# Patient Record
Sex: FEMALE | Race: WHITE | Employment: FULL TIME | URBAN - METROPOLITAN AREA
[De-identification: names, ages, dates, MRNs, and addresses within clinical notes are randomized per-mention and may not be internally consistent; named-entity substitution may affect disease eponyms.]

---

## 2017-03-20 ENCOUNTER — HOSPITAL ENCOUNTER (OUTPATIENT)
Dept: MAMMOGRAPHY | Age: 52
Discharge: HOME | End: 2017-03-20
Attending: OBSTETRICS & GYNECOLOGY | Admitting: OBSTETRICS & GYNECOLOGY

## 2017-03-20 DIAGNOSIS — Z12.31 VISIT FOR SCREENING MAMMOGRAM: ICD-10-CM

## 2018-03-26 ENCOUNTER — HOSPITAL ENCOUNTER (OUTPATIENT)
Dept: MAMMOGRAPHY | Age: 53
Discharge: HOME | End: 2018-03-26
Attending: OBSTETRICS & GYNECOLOGY | Admitting: OBSTETRICS & GYNECOLOGY

## 2018-03-26 DIAGNOSIS — Z12.31 VISIT FOR SCREENING MAMMOGRAM: ICD-10-CM

## 2019-03-27 ENCOUNTER — HOSPITAL ENCOUNTER (OUTPATIENT)
Dept: MAMMOGRAPHY | Age: 54
Discharge: HOME | End: 2019-04-01
Payer: COMMERCIAL

## 2019-03-27 DIAGNOSIS — Z12.31 VISIT FOR SCREENING MAMMOGRAM: ICD-10-CM

## 2019-03-27 PROCEDURE — 77067 SCR MAMMO BI INCL CAD: CPT

## 2019-06-10 ENCOUNTER — NURSE TRIAGE (OUTPATIENT)
Dept: OTHER | Facility: CLINIC | Age: 54
End: 2019-06-10

## 2019-06-10 NOTE — TELEPHONE ENCOUNTER
Reason for Disposition   General information question, no triage required and triager able to answer question    Protocols used: INFORMATION ONLY CALL-ADULT-    Spoke with caller directly regarding needs for new PT that specalizes in VRT. Caller states that she is a new [employer] employee and has not received her insurance card at this time. PT providers found in [facility] web site and provided to caller. [employer] web site also provided to caller and advised to follow up with HR for insurance coverage. Pt agrees with plan.

## 2019-06-25 ENCOUNTER — OFFICE VISIT (OUTPATIENT)
Dept: PRIMARY CARE CLINIC | Age: 54
End: 2019-06-25
Payer: COMMERCIAL

## 2019-06-25 VITALS
WEIGHT: 175 LBS | OXYGEN SATURATION: 98 % | BODY MASS INDEX: 27.47 KG/M2 | DIASTOLIC BLOOD PRESSURE: 64 MMHG | TEMPERATURE: 99 F | HEART RATE: 80 BPM | HEIGHT: 67 IN | SYSTOLIC BLOOD PRESSURE: 138 MMHG | RESPIRATION RATE: 15 BRPM

## 2019-06-25 DIAGNOSIS — J01.00 ACUTE NON-RECURRENT MAXILLARY SINUSITIS: Primary | ICD-10-CM

## 2019-06-25 PROCEDURE — 99203 OFFICE O/P NEW LOW 30 MIN: CPT | Performed by: PHYSICIAN ASSISTANT

## 2019-06-25 RX ORDER — GUAIFENESIN, PSEUDOEPHEDRINE HYDROCHLORIDE 600; 60 MG/1; MG/1
1 TABLET, EXTENDED RELEASE ORAL EVERY 12 HOURS PRN
Qty: 20 TABLET | Refills: 0 | Status: SHIPPED | OUTPATIENT
Start: 2019-06-25 | End: 2019-07-02

## 2019-06-25 RX ORDER — PREDNISONE 20 MG/1
TABLET ORAL
Qty: 14 TABLET | Refills: 0 | Status: SHIPPED | OUTPATIENT
Start: 2019-06-25 | End: 2019-09-13

## 2019-06-25 RX ORDER — AMOXICILLIN AND CLAVULANATE POTASSIUM 875; 125 MG/1; MG/1
1 TABLET, FILM COATED ORAL 2 TIMES DAILY
Qty: 14 TABLET | Refills: 0 | Status: SHIPPED | OUTPATIENT
Start: 2019-06-25 | End: 2019-07-02

## 2019-06-25 ASSESSMENT — ENCOUNTER SYMPTOMS
CHEST TIGHTNESS: 0
COUGH: 1
SHORTNESS OF BREATH: 0
SORE THROAT: 1
RHINORRHEA: 0
SINUS PRESSURE: 1

## 2019-06-25 NOTE — PATIENT INSTRUCTIONS

## 2019-06-25 NOTE — PROGRESS NOTES
augmentin                                                     Reason for Visit:   Chief Complaint   Patient presents with    Pharyngitis     started last thursday     Congestion     Patient History     Pt c/o coughing and congestion x Thursday. +sinus pressure in cheeks now. No fever, chills, body aches. +sore throat. No ear pain or popping.  +post nasal drainage. No chest pain, shortness of breath       No past medical history on file. No past surgical history on file. Allergies: Allergies   Allergen Reactions    Doxycycline Rash       Review of Systems     ROS: Please refer to HPI for anypertinent positive findings, as all other systems were reviewed as negative unless otherwise listed above. Review of Systems   Constitutional: Negative for chills and fever. HENT: Positive for congestion, postnasal drip, sinus pressure and sore throat. Negative for ear pain and rhinorrhea. Respiratory: Positive for cough. Negative for chest tightness and shortness of breath. Neurological: Negative for dizziness and headaches. Hematological: Negative for adenopathy. Physical Exam     Vitals:    06/25/19 0917   BP: 138/64   Pulse:    Resp:    Temp:    SpO2:          Physical Exam   Constitutional: She is oriented to person, place, and time. She appears well-developed and well-nourished. HENT:   Right Ear: Tympanic membrane and external ear normal.   Left Ear: Tympanic membrane and external ear normal.   Mouth/Throat: Oropharynx is clear and moist.   Eyes: Conjunctivae are normal.   Neck: Neck supple. Cardiovascular: Normal rate, regular rhythm and normal heart sounds. Pulmonary/Chest: Effort normal and breath sounds normal. No respiratory distress. Lymphadenopathy:     She has no cervical adenopathy. Neurological: She is alert and oriented to person, place, and time. Skin: Skin is warm and dry. Psychiatric: She has a normal mood and affect. Assessment:    1.  Acute non-recurrent maxillary sinusitis  - predniSONE (DELTASONE) 20 MG tablet; Take 2 tabs po BID x2 days, then take 1 tab po BID x2 days, then take 1 tab po QD x2 days  Dispense: 14 tablet; Refill: 0  - pseudoephedrine-guaiFENesin (MUCINEX D)  MG per extended release tablet; Take 1 tablet by mouth every 12 hours as needed for Congestion  Dispense: 20 tablet; Refill: 0  - amoxicillin-clavulanate (AUGMENTIN) 875-125 MG per tablet; Take 1 tablet by mouth 2 times daily for 7 days  Dispense: 14 tablet; Refill: 0    Plan:    Likely viral.  Only start augmentin if worsening (fever, chills, body aches, consistently green mucous) or if not improving after 7-10 days. Plenty of fluids.   F/u prn

## 2019-09-13 ENCOUNTER — OFFICE VISIT (OUTPATIENT)
Dept: INTERNAL MEDICINE CLINIC | Age: 54
End: 2019-09-13
Payer: COMMERCIAL

## 2019-09-13 VITALS
HEIGHT: 67 IN | SYSTOLIC BLOOD PRESSURE: 140 MMHG | WEIGHT: 185.8 LBS | DIASTOLIC BLOOD PRESSURE: 88 MMHG | BODY MASS INDEX: 29.16 KG/M2

## 2019-09-13 DIAGNOSIS — R03.0 ELEVATED BLOOD-PRESSURE READING WITHOUT DIAGNOSIS OF HYPERTENSION: ICD-10-CM

## 2019-09-13 DIAGNOSIS — R42 VERTIGO: ICD-10-CM

## 2019-09-13 DIAGNOSIS — Z23 NEED FOR INFLUENZA VACCINATION: ICD-10-CM

## 2019-09-13 DIAGNOSIS — Z00.00 ROUTINE GENERAL MEDICAL EXAMINATION AT A HEALTH CARE FACILITY: Primary | ICD-10-CM

## 2019-09-13 PROCEDURE — 90471 IMMUNIZATION ADMIN: CPT | Performed by: INTERNAL MEDICINE

## 2019-09-13 PROCEDURE — 90686 IIV4 VACC NO PRSV 0.5 ML IM: CPT | Performed by: INTERNAL MEDICINE

## 2019-09-13 PROCEDURE — 99386 PREV VISIT NEW AGE 40-64: CPT | Performed by: INTERNAL MEDICINE

## 2019-09-13 SDOH — HEALTH STABILITY: MENTAL HEALTH: HOW OFTEN DO YOU HAVE A DRINK CONTAINING ALCOHOL?: 4 OR MORE TIMES A WEEK

## 2019-09-13 ASSESSMENT — PATIENT HEALTH QUESTIONNAIRE - PHQ9
1. LITTLE INTEREST OR PLEASURE IN DOING THINGS: 0
SUM OF ALL RESPONSES TO PHQ QUESTIONS 1-9: 0
SUM OF ALL RESPONSES TO PHQ9 QUESTIONS 1 & 2: 0
SUM OF ALL RESPONSES TO PHQ QUESTIONS 1-9: 0
2. FEELING DOWN, DEPRESSED OR HOPELESS: 0

## 2019-09-13 NOTE — PATIENT INSTRUCTIONS
serving is 1 slice of bread, 1 ounce of dry cereal, or ½ cup of cooked rice, pasta, or cooked cereal. Try to choose whole-grain products as much as possible. · Limit lean meat, poultry, and fish to 2 servings each day. A serving is 3 ounces, about the size of a deck of cards. · Eat 4 to 5 servings of nuts, seeds, and legumes (cooked dried beans, lentils, and split peas) each week. A serving is 1/3 cup of nuts, 2 tablespoons of seeds, or ½ cup of cooked beans or peas. · Limit fats and oils to 2 to 3 servings each day. A serving is 1 teaspoon of vegetable oil or 2 tablespoons of salad dressing. · Limit sweets and added sugars to 5 servings or less a week. A serving is 1 tablespoon jelly or jam, ½ cup sorbet, or 1 cup of lemonade. · Eat less than 2,300 milligrams (mg) of sodium a day. If you limit your sodium to 1,500 mg a day, you can lower your blood pressure even more. Tips for success  · Start small. Do not try to make dramatic changes to your diet all at once. You might feel that you are missing out on your favorite foods and then be more likely to not follow the plan. Make small changes, and stick with them. Once those changes become habit, add a few more changes. · Try some of the following:  ? Make it a goal to eat a fruit or vegetable at every meal and at snacks. This will make it easy to get the recommended amount of fruits and vegetables each day. ? Try yogurt topped with fruit and nuts for a snack or healthy dessert. ? Add lettuce, tomato, cucumber, and onion to sandwiches. ? Combine a ready-made pizza crust with low-fat mozzarella cheese and lots of vegetable toppings. Try using tomatoes, squash, spinach, broccoli, carrots, cauliflower, and onions. ? Have a variety of cut-up vegetables with a low-fat dip as an appetizer instead of chips and dip. ? Sprinkle sunflower seeds or chopped almonds over salads. Or try adding chopped walnuts or almonds to cooked vegetables.   ? Try some vegetarian meals

## 2019-09-14 ASSESSMENT — ENCOUNTER SYMPTOMS
SHORTNESS OF BREATH: 0
ABDOMINAL PAIN: 0
COUGH: 0

## 2020-02-24 ENCOUNTER — NURSE TRIAGE (OUTPATIENT)
Dept: OTHER | Facility: CLINIC | Age: 55
End: 2020-02-24

## 2020-02-24 NOTE — TELEPHONE ENCOUNTER
Reason for Disposition   Sinus congestion as part of a cold, present < 10 days    Protocols used: SINUS PAIN AND CONGESTION-ADULT-[place]    Patient is in [place]. She states she started with nasal congestion, runny nose, post nasal gtt, slight cough, and slight scratchy throat yesterday. She has gotten her flu vaccine. She doesn't believe she has a fever, but unsure. She does have muscle aches. She is asking if she needs to be seen for these sx. She is flying home tomorrow and is concerned about infecting other. Recommend increase fluid intake. Cool mist humidifier. Nasal saline mist every 2 hours and prn. Recommend she wear a mask on the plane, if she has a fever, may need to reconsider. Chew gum during take off and landing. If she feels she needs to be seen out of town, proceed to clinic.

## 2020-03-03 ENCOUNTER — OFFICE VISIT (OUTPATIENT)
Dept: INTERNAL MEDICINE CLINIC | Age: 55
End: 2020-03-03
Payer: COMMERCIAL

## 2020-03-03 VITALS
BODY MASS INDEX: 29.19 KG/M2 | SYSTOLIC BLOOD PRESSURE: 130 MMHG | DIASTOLIC BLOOD PRESSURE: 90 MMHG | WEIGHT: 186 LBS | HEIGHT: 67 IN

## 2020-03-03 PROCEDURE — 99213 OFFICE O/P EST LOW 20 MIN: CPT | Performed by: INTERNAL MEDICINE

## 2020-03-03 RX ORDER — FLUOXETINE HYDROCHLORIDE 20 MG/1
20 CAPSULE ORAL DAILY
Qty: 30 CAPSULE | Refills: 2 | Status: SHIPPED | OUTPATIENT
Start: 2020-03-03 | End: 2020-03-31 | Stop reason: SDUPTHER

## 2020-03-03 RX ORDER — LORAZEPAM 0.5 MG/1
0.5 TABLET ORAL 2 TIMES DAILY PRN
Qty: 20 TABLET | Refills: 0 | Status: SHIPPED | OUTPATIENT
Start: 2020-03-03 | End: 2020-03-10

## 2020-03-03 SDOH — ECONOMIC STABILITY: TRANSPORTATION INSECURITY
IN THE PAST 12 MONTHS, HAS THE LACK OF TRANSPORTATION KEPT YOU FROM MEDICAL APPOINTMENTS OR FROM GETTING MEDICATIONS?: NO

## 2020-03-03 SDOH — ECONOMIC STABILITY: TRANSPORTATION INSECURITY
IN THE PAST 12 MONTHS, HAS LACK OF TRANSPORTATION KEPT YOU FROM MEETINGS, WORK, OR FROM GETTING THINGS NEEDED FOR DAILY LIVING?: NO

## 2020-03-03 SDOH — ECONOMIC STABILITY: INCOME INSECURITY: HOW HARD IS IT FOR YOU TO PAY FOR THE VERY BASICS LIKE FOOD, HOUSING, MEDICAL CARE, AND HEATING?: NOT HARD AT ALL

## 2020-03-03 SDOH — ECONOMIC STABILITY: FOOD INSECURITY: WITHIN THE PAST 12 MONTHS, THE FOOD YOU BOUGHT JUST DIDN'T LAST AND YOU DIDN'T HAVE MONEY TO GET MORE.: NEVER TRUE

## 2020-03-03 SDOH — ECONOMIC STABILITY: FOOD INSECURITY: WITHIN THE PAST 12 MONTHS, YOU WORRIED THAT YOUR FOOD WOULD RUN OUT BEFORE YOU GOT MONEY TO BUY MORE.: NEVER TRUE

## 2020-03-03 ASSESSMENT — PATIENT HEALTH QUESTIONNAIRE - PHQ9
SUM OF ALL RESPONSES TO PHQ9 QUESTIONS 1 & 2: 0
SUM OF ALL RESPONSES TO PHQ QUESTIONS 1-9: 0
1. LITTLE INTEREST OR PLEASURE IN DOING THINGS: 0
SUM OF ALL RESPONSES TO PHQ QUESTIONS 1-9: 0
2. FEELING DOWN, DEPRESSED OR HOPELESS: 0

## 2020-03-03 NOTE — PROGRESS NOTES
3/3/2020     Yael Dye (:  1965) is a 53 y.o. female, here for evaluation of the following medical concerns:    Chief Complaint   Patient presents with    Anxiety     work related        HPI    She has been having increased anxiety recently due to work stress. She [place] is not able to turn off from work, not able to relax on the weekends. Has no trouble falling asleep but will wake up in the middle of the night and have trouble falling back to sleep. Is having anxiety attacks to the point that she cannot focus on doing her work. Has never had anything like this before. She has had depression and was on Prozac and Wellbutrin for several years. She does not feel like depression has worsened. Review of Systems   Psychiatric/Behavioral: The patient is nervous/anxious. Prior to Visit Medications    Medication Sig Taking? Authorizing Provider   FLUoxetine (PROZAC) 20 MG capsule Take 1 capsule by mouth daily Yes Delta Alejo MD   LORazepam (ATIVAN) 0.5 MG tablet Take 1 tablet by mouth 2 times daily as needed for Anxiety for up to 7 days. Yes Delta Alejo MD        Past Medical History:   Diagnosis Date    Depression     Vertigo        Past Surgical History:   Procedure Laterality Date    BREAST LUMPECTOMY      benign       Social History     Tobacco Use    Smoking status: Never Smoker    Smokeless tobacco: Never Used   Substance Use Topics    Alcohol use: Yes     Frequency: 4 or more times a week        Family History   Problem Relation Age of Onset    Breast Cancer Mother     Hypertension Father        Vitals:    20 1451 20 1456   BP: (!) 130/90 (!) 130/90   Weight: 186 lb (84.4 kg)    Height: 5' 7\" (1.702 m)      Estimated body mass index is 29.13 kg/m² as calculated from the following:    Height as of this encounter: 5' 7\" (1.702 m). Weight as of this encounter: 186 lb (84.4 kg). Physical Exam  Vitals signs reviewed.    Constitutional:       General: She is not in acute distress. Appearance: She is well-developed. HENT:      Head: Normocephalic and atraumatic. Cardiovascular:      Rate and Rhythm: Normal rate and regular rhythm. Heart sounds: Normal heart sounds. Pulmonary:      Effort: Pulmonary effort is normal. No respiratory distress. Breath sounds: Normal breath sounds. Skin:     General: Skin is warm and dry. Neurological:      Mental Status: She is alert. Psychiatric:         Mood and Affect: Mood is anxious. Behavior: Behavior normal.         Thought Content: Thought content normal.         Judgment: Judgment normal.         ASSESSMENT/PLAN:  1. Anxiety  -In the setting of work stress. She has done well with Prozac in the past for depression, will start here.  -Start Prozac 20 mg once daily. Since he will take at least a couple of weeks before medication will work, can use low-dose of lorazepam as needed. Counseled on side effects, cautioned against driving while taking the medication.  - LORazepam (ATIVAN) 0.5 MG tablet; Take 1 tablet by mouth 2 times daily as needed for Anxiety for up to 7 days. Dispense: 20 tablet; Refill: 0      Return in about 4 weeks (around 3/31/2020) for anxiety.

## 2020-03-31 ENCOUNTER — TELEMEDICINE (OUTPATIENT)
Dept: INTERNAL MEDICINE CLINIC | Age: 55
End: 2020-03-31
Payer: COMMERCIAL

## 2020-03-31 PROBLEM — F41.9 ANXIETY: Status: ACTIVE | Noted: 2020-03-31

## 2020-03-31 PROCEDURE — 99212 OFFICE O/P EST SF 10 MIN: CPT | Performed by: INTERNAL MEDICINE

## 2020-03-31 RX ORDER — FLUOXETINE HYDROCHLORIDE 20 MG/1
20 CAPSULE ORAL DAILY
Qty: 90 CAPSULE | Refills: 1 | Status: SHIPPED | OUTPATIENT
Start: 2020-03-31 | End: 2021-02-12

## 2020-03-31 NOTE — PROGRESS NOTES
3/31/2020    TELEHEALTH EVALUATION -- Audio[facility]Visual [facility]    HPI:    Kristina Ceballos (:  1965) has requested an audio/video evaluation for the following concern(s):    Follow up for anxiety. Anxiety has improved significantly. She thinks that the Prozac has helped. Hard to say how much of the improvement is Prozac versus changes at work, and looks like the original deadline will be moved back due to the coronavirus. She denies any side effects from Prozac. Review of Systems   Psychiatric/Behavioral: The patient is not nervous/anxious. Prior to Visit Medications    Medication Sig Taking? Authorizing Provider   FLUoxetine (PROZAC) 20 MG capsule Take 1 capsule by mouth daily Yes Vicky Mcconnell MD       Social History     Tobacco Use    Smoking status: Never Smoker    Smokeless tobacco: Never Used   Substance Use Topics    Alcohol use: Yes     Frequency: 4 or more times a week    Drug use: Never            PHYSICAL EXAMINATION:  [ INSTRUCTIONS:  \"[x]\" Indicates a positive item  \"[]\" Indicates a negative item  -- DELETE ALL ITEMS NOT EXAMINED]  Vital Signs: (As obtained by patient/caregiver or practitioner observation)    Blood pressure-  Heart rate-    Respiratory rate-    Temperature-  Pulse oximetry-     Constitutional: [x] Appears well-developed and well-nourished [x] No apparent distress      [] Abnormal-   Mental status  [x] Alert and awake  [] Oriented to person/place/time [x]Able to follow commands      Eyes:  EOM    []  Normal  [] Abnormal-  Sclera  []  Normal  [] Abnormal -         Discharge []  None visible  [] Abnormal -    HENT:   [x] Normocephalic, atraumatic.   [] Abnormal   [] Mouth/Throat: Mucous membranes are moist.     External Ears [x] Normal  [] Abnormal-     Neck: [] No visualized mass     Pulmonary/Chest: [x] Respiratory effort normal.  [x] No visualized signs of difficulty breathing or respiratory distress        [] Abnormal-

## 2020-06-11 ENCOUNTER — OFFICE VISIT (OUTPATIENT)
Dept: GYNECOLOGY | Age: 55
End: 2020-06-11
Payer: COMMERCIAL

## 2020-06-11 VITALS — WEIGHT: 189 LBS | TEMPERATURE: 98.2 F | RESPIRATION RATE: 18 BRPM | HEIGHT: 67 IN | BODY MASS INDEX: 29.66 KG/M2

## 2020-06-11 PROCEDURE — 99386 PREV VISIT NEW AGE 40-64: CPT | Performed by: OBSTETRICS & GYNECOLOGY

## 2020-06-11 RX ORDER — SULFAMETHOXAZOLE AND TRIMETHOPRIM 800; 160 MG/1; MG/1
1 TABLET ORAL 2 TIMES DAILY
Qty: 14 TABLET | Refills: 0 | Status: SHIPPED | OUTPATIENT
Start: 2020-06-11 | End: 2020-06-18

## 2020-06-11 RX ORDER — ESTRADIOL 0.1 MG/G
CREAM VAGINAL
Qty: 1 TUBE | Refills: 3 | Status: SHIPPED | OUTPATIENT
Start: 2020-06-11

## 2020-06-11 ASSESSMENT — ENCOUNTER SYMPTOMS
COUGH: 0
BACK PAIN: 0
ANAL BLEEDING: 0
DIARRHEA: 0
RECTAL PAIN: 0
ABDOMINAL DISTENTION: 0
BLOOD IN STOOL: 0
TROUBLE SWALLOWING: 0
WHEEZING: 0
SHORTNESS OF BREATH: 0
VOMITING: 0
PHOTOPHOBIA: 0
COLOR CHANGE: 0
NAUSEA: 0
SORE THROAT: 0
APNEA: 0
CONSTIPATION: 0
ABDOMINAL PAIN: 0
CHEST TIGHTNESS: 0

## 2020-06-11 NOTE — PROGRESS NOTES
change, chills, fatigue, fever and unexpected weight change. HENT: Negative for ear pain, hearing loss, mouth sores, sore throat and trouble swallowing. Eyes: Negative for photophobia and visual disturbance. Respiratory: Negative for apnea, cough, chest tightness, shortness of breath and wheezing. Cardiovascular: Negative for chest pain, palpitations and leg swelling. Gastrointestinal: Negative for abdominal distention, abdominal pain, anal bleeding, blood in stool, constipation, diarrhea, nausea, rectal pain and vomiting. Endocrine: Negative for cold intolerance, heat intolerance, polydipsia, polyphagia and polyuria. Genitourinary: Negative for difficulty urinating, dyspareunia, dysuria, enuresis, frequency, genital sores, hematuria, menstrual problem, pelvic pain, urgency, vaginal bleeding, vaginal discharge and vaginal pain. Musculoskeletal: Negative for arthralgias, back pain, joint swelling and myalgias. Skin: Negative for color change and rash. Neurological: Negative for dizziness, seizures, syncope, light-headedness and headaches. Psychiatric/Behavioral: Negative for agitation, behavioral problems, confusion, decreased concentration, dysphoric mood, hallucinations, self-injury, sleep disturbance and suicidal ideas. The patient is not nervous/anxious and is not hyperactive. Physical Exam:  Temp 98.2 °F (36.8 °C)   Resp 18   Ht 5' 7\" (1.702 m)   Wt 189 lb (85.7 kg)   LMP  (LMP Unknown)   Breastfeeding No   BMI 29.60 kg/m²   BP Readings from Last 3 Encounters:   03/03/20 (!) 130/90   09/13/19 (!) 140/88   06/25/19 138/64     Body mass index is 29.6 kg/m². Physical Exam  Constitutional:       General: She is not in acute distress. Appearance: She is well-developed. HENT:      Head: Normocephalic and atraumatic. Mouth/Throat:      Pharynx: No oropharyngeal exudate. Eyes:      General: No scleral icterus. Right eye: No discharge.          Left eye: No discharge. Conjunctiva/sclera: Conjunctivae normal.   Neck:      Thyroid: No thyromegaly. Cardiovascular:      Rate and Rhythm: Normal rate and regular rhythm. Heart sounds: Normal heart sounds. Pulmonary:      Effort: Pulmonary effort is normal.      Breath sounds: Normal breath sounds. Abdominal:      General: Bowel sounds are normal. There is no distension. Palpations: Abdomen is soft. There is no mass. Tenderness: There is no abdominal tenderness. There is no guarding or rebound. Genitourinary:     Labia:         Right: No rash, tenderness, lesion or injury. Left: No rash, tenderness, lesion or injury. Vagina: Normal. No signs of injury and foreign body. No vaginal discharge, erythema or tenderness. Cervix: No cervical motion tenderness, discharge or friability. Uterus: Not deviated, not enlarged, not fixed and not tender. Adnexa:         Right: No mass, tenderness or fullness. Left: No mass, tenderness or fullness. Rectum: No mass or external hemorrhoid. Comments: 2x2 cm firm non tender mass right labia majora   Skin:     General: Skin is warm. Coloration: Skin is not pale. Findings: No erythema or rash. Neurological:      Mental Status: She is alert. Psychiatric:         Behavior: Behavior normal. Behavior is cooperative. Thought Content: Thought content normal.         Judgment: Judgment normal.           Assessment/Plan:  1. Well woman exam with routine gynecological exam  - PAP SMEAR  Self breast exam discussed and encouraged  Diet and exercise discussed  Discussed daily multi-vitamin and adequate calcium and vitamin D in diet    2. Family history of malignant neoplasm of breast in relative diagnosed when younger than 39years of age  - Paula Almazan MD, Breast Surgery, [facility] DIGITAL SCREEN W OR WO CAD BILATERAL; Future    3.  Atrophic vaginitis  - estradiol (ESTRACE VAGINAL) 0.1

## 2020-06-22 ENCOUNTER — OFFICE VISIT (OUTPATIENT)
Dept: GYNECOLOGY | Age: 55
End: 2020-06-22
Payer: COMMERCIAL

## 2020-06-22 VITALS — BODY MASS INDEX: 29.53 KG/M2 | HEIGHT: 67 IN | WEIGHT: 188.13 LBS | TEMPERATURE: 98.3 F | RESPIRATION RATE: 18 BRPM

## 2020-06-22 PROCEDURE — 99213 OFFICE O/P EST LOW 20 MIN: CPT | Performed by: OBSTETRICS & GYNECOLOGY

## 2020-06-22 ASSESSMENT — ENCOUNTER SYMPTOMS
WHEEZING: 0
PHOTOPHOBIA: 0
NAUSEA: 0
TROUBLE SWALLOWING: 0
ANAL BLEEDING: 0
APNEA: 0
RECTAL PAIN: 0
VOMITING: 0
CONSTIPATION: 0
CHEST TIGHTNESS: 0
BACK PAIN: 0
ABDOMINAL PAIN: 0
BLOOD IN STOOL: 0
COUGH: 0
SORE THROAT: 0
COLOR CHANGE: 0
ABDOMINAL DISTENTION: 0
DIARRHEA: 0
SHORTNESS OF BREATH: 0

## 2020-07-13 ENCOUNTER — TELEPHONE (OUTPATIENT)
Dept: GYNECOLOGY | Age: 55
End: 2020-07-13

## 2020-07-13 NOTE — TELEPHONE ENCOUNTER
[facility] received a call from:    Name of Caller: patient    Relationship to patient:self    Organization name:n/a    Best contact number: [phone]    Reason for call: Patient would like to reschedule her appointment today.

## 2020-07-16 ENCOUNTER — HOSPITAL ENCOUNTER (OUTPATIENT)
Dept: MAMMOGRAPHY | Age: 55
Discharge: HOME | End: 2020-07-16
Payer: COMMERCIAL

## 2020-07-16 ENCOUNTER — OFFICE VISIT (OUTPATIENT)
Dept: SURGERY | Age: 55
End: 2020-07-16
Payer: COMMERCIAL

## 2020-07-16 VITALS
TEMPERATURE: 97.9 F | DIASTOLIC BLOOD PRESSURE: 84 MMHG | WEIGHT: 185 LBS | BODY MASS INDEX: 29.03 KG/M2 | SYSTOLIC BLOOD PRESSURE: 142 MMHG | HEART RATE: 85 BPM | RESPIRATION RATE: 16 BRPM | HEIGHT: 67 IN

## 2020-07-16 PROCEDURE — 99204 OFFICE O/P NEW MOD 45 MIN: CPT | Performed by: SURGERY

## 2020-07-16 PROCEDURE — 77063 BREAST TOMOSYNTHESIS BI: CPT

## 2020-07-16 ASSESSMENT — ENCOUNTER SYMPTOMS
ABDOMINAL PAIN: 0
ABDOMINAL DISTENTION: 0
SHORTNESS OF BREATH: 0
BACK PAIN: 0
COUGH: 0

## 2020-07-16 NOTE — PROGRESS NOTES
CC: High Risk Breast     HPI:  Janes Rico is a 53 y.o.  woman here for evaluation of her risk for breast cancer. Her personal history is significant for benign right breast biopsy in the mid . Her family cancer history is significant for breast cancer in her mother, diagnosed initially at age 29. Her mother was treated with mastectomy, had contralateral second breast cancer dx at age 55, and she ultimately  of metastatic breast cancer. She is due for annual screening mammogram today. Based on Tyrer-Cuzik 8.0 risk model, her lifetime risk of breast cancer is 30.1%. Colon polyps on scope in , due again . Family History   Problem Relation Age of Onset    Breast Cancer Mother     Hypertension Father      Menstrual History:  Menarche age 14. [id number]. Age first live birth 31  postmenopausal 45  Ovaries/uterus intact  Oral contraceptives Yes for 15 years  Hormone replacement No    Past Medical History:   Diagnosis Date    Breast mass     Right breast    Depression     Menopause ovarian failure     Vertigo      Past Surgical History:   Procedure Laterality Date    BREAST BIOPSY      BREAST LUMPECTOMY      benign     Allergies as of 2020 - Review Complete 2020   Allergen Reaction Noted    Doxycycline Rash 2019     Social History     Tobacco Use    Smoking status: Never Smoker    Smokeless tobacco: Never Used   Substance Use Topics    Alcohol use: Yes     Frequency: 4 or more times a week      Current Outpatient Medications on File Prior to Visit   Medication Sig Dispense Refill    estradiol (ESTRACE VAGINAL) 0.1 MG/GM vaginal cream Place 1 gram per vagina twice weekly 1 Tube 3    FLUoxetine (PROZAC) 20 MG capsule Take 1 capsule by mouth daily (Patient not taking: Reported on 2020) 90 capsule 1     No current facility-administered medications on file prior to visit.        Review of Systems   Constitutional: Negative for appetite change, fatigue and unexpected weight change. Respiratory: Negative for cough and shortness of breath. Cardiovascular: Negative for chest pain and leg swelling. Gastrointestinal: Negative for abdominal distention and abdominal pain. Genitourinary: Negative for difficulty urinating and dysuria. Musculoskeletal: Negative for arthralgias and back pain. Skin: Negative for rash and wound. Allergic/Immunologic: Negative for environmental allergies and food allergies. Neurological: Negative for dizziness and headaches. Hematological: Negative for adenopathy. Does not bruise/bleed easily. Psychiatric/Behavioral: Negative for dysphoric mood. The patient is not nervous/anxious. Exam:  BP (!) 142/84 (Site: Left Upper Arm, Position: Sitting, Cuff Size: Medium Adult)   Pulse 85   Temp 97.9 °F (36.6 °C) (Temporal)   Resp 16   Ht 5' 7.01\" (1.702 m)   Wt 185 lb (83.9 kg)   LMP  (LMP Unknown)   BMI 28.97 kg/m²   Constitutional: She appears well-nourished. No apparent distress. Head: Normocephalic and atraumatic. Eyes: EOM are normal. Pupils are equal, round, and reactive to light. Neck: Neck supple. No tracheal deviation present. No thyromegaly. Cardiovascular: Regular rate and rhythm. Pulmonary: Respirations are non-labored no wheezing. Lymphatics: No palpable cervical, supraclavicular, or axillary lymphadenopathy. Breast:  Right: No masses, nipple discharge, or overlying skin changes. Left: No masses, nipple discharge, or overlying skin changes. There is no axillary lymphadenopathy palpated bilaterally. Skin: No rash noted. Multiple nevi chest, back, upper extremities, none concerning. Extremities: Well perfused, no edema. Neurologic: Alert and oriented. Assessment and Plan:  Family hx premenopausal bilateral breast cancer in her mother.    Based on Tyrer-Cuzik 8.0 risk model, her lifetime risk of breast cancer is 30.1%    I recommend alternating screening MRI and mammogram, q6 months, along with clinical breast exam.   Genetic counseling and testing are appropriate, and referral will be made. We discussed risk reducing medications, including tamoxifen, raloxifene, and aromatase inhibitors. We also discussed risk reducing surgical options, including prophylactic mastectomies and oophorectomy. For now, will plan on mammogram today. F/u in 6 months with breast MRI and exam.   Diet, exercise, maintain healthy weight. Will continue to discuss medical and surgical risk reducing options.

## 2020-07-16 NOTE — PROGRESS NOTES
Patient Risk: 30.1%  Average Population Risk: 8.4%       Breast History:  History of Previous Breast Biopsy:yes  Self Breast Exams Completed:yes-heterogeneous density  Family History of Breast Cancer:yes    Mother-dx: 29, 55     Family History of Other Cancers:no   Ashkenazi Tenriism Decent:no    Gyne History:  Age of Menarche: 14 years  : 2  Para: 2  Age of Menopause: 45 years   Age of first live Birth: 31 years  History of Hysterectomy / REN-BSO: No  History of OCP's/HRT: OCP     When and how long:  15 years, not current   Family History or personal history of Ovarian Cancer: no

## 2020-07-27 ENCOUNTER — OFFICE VISIT (OUTPATIENT)
Dept: GYNECOLOGY | Age: 55
End: 2020-07-27
Payer: COMMERCIAL

## 2020-07-27 VITALS — RESPIRATION RATE: 18 BRPM | TEMPERATURE: 98.1 F | BODY MASS INDEX: 30.02 KG/M2 | HEIGHT: 67 IN | WEIGHT: 191.25 LBS

## 2020-07-27 PROCEDURE — 99213 OFFICE O/P EST LOW 20 MIN: CPT | Performed by: OBSTETRICS & GYNECOLOGY

## 2020-07-27 ASSESSMENT — ENCOUNTER SYMPTOMS
BLOOD IN STOOL: 0
PHOTOPHOBIA: 0
VOMITING: 0
NAUSEA: 0
SORE THROAT: 0
ABDOMINAL DISTENTION: 0
CHEST TIGHTNESS: 0
BACK PAIN: 0
COUGH: 0
WHEEZING: 0
RECTAL PAIN: 0
COLOR CHANGE: 0
ANAL BLEEDING: 0
APNEA: 0
TROUBLE SWALLOWING: 0
SHORTNESS OF BREATH: 0
CONSTIPATION: 0
DIARRHEA: 0
ABDOMINAL PAIN: 0

## 2020-07-27 NOTE — PROGRESS NOTES
Jim Fuentes  YOB: 1965    Date of Service:  2020    Chief Complaint:   Jim Fuentes is a 53 y.o.  female who presents for right vulvar mass        HPI:  Patient is postmenopausal- she has history of right vulvar cyst, initially noticed by patient on  06/10/2020. She denies any symptoms related to the cyst, it is not painful, no drainage. She denies postmenopausal bleeding. On last visit 2020 - the cyst was 2x2 cm in size. She is here for repeat check today.      Health Maintenance   Topic Date Due    DTaP/Tdap/Td vaccine (1 - Tdap) 10/18/1983    Lipid screen  10/18/2004    Diabetes screen  10/18/2004    Colon cancer screen colonoscopy  10/18/2014    Hepatitis C screen  2021 (Originally 1965)    HIV screen  2021 (Originally 10/18/1979)    Flu vaccine (1) 2020    Breast cancer screen  2021    Cervical cancer screen  2023    Shingles Vaccine  Completed    Hepatitis A vaccine  Aged Out    Hepatitis B vaccine  Aged Out    Hib vaccine  Aged Out    Meningococcal (ACWY) vaccine  Aged Out    Pneumococcal 0-64 years Vaccine  Aged Out       Past Medical History:   Diagnosis Date    Breast mass     Right breast    Depression     Menopause ovarian failure     Vertigo      Past Surgical History:   Procedure Laterality Date    BREAST BIOPSY      BREAST LUMPECTOMY      benign     OB History    Para Term  AB Living   2 2 2     2   SAB TAB Ectopic Molar Multiple Live Births             2      # Outcome Date GA Lbr Alfredo/2nd Weight Sex Delivery Anes PTL Lv   2 Term      Vag-Spont   [facility]   1 Term      Vag-Spont   [facility]      Complications: Cord around body     Social History     Socioeconomic History    Marital status:      Spouse name: Not on file    Number of children: Not on file    Years of education: Not on file    Highest education level: Not on file   Occupational History    Not on file   Social Needs    Cervix: No cervical motion tenderness or discharge. Adnexa:         Right: No mass or tenderness. Left: No mass or tenderness. Skin:     General: Skin is warm. Neurological:      Mental Status: She is alert and oriented to person, place, and time. Psychiatric:         Behavior: Behavior normal.             Assessment/Plan  1. Cyst of vulva  Decreased in size from 2x2 cm to 8 x 8 mm on exam today  Continue sitz baths and careful monitoring   Call office for worsening symptoms       Return in about 2 months (around 9/27/2020).

## 2021-02-12 ENCOUNTER — OFFICE VISIT (OUTPATIENT)
Dept: INTERNAL MEDICINE CLINIC | Age: 56
End: 2021-02-12
Payer: COMMERCIAL

## 2021-02-12 VITALS
SYSTOLIC BLOOD PRESSURE: 134 MMHG | HEIGHT: 67 IN | WEIGHT: 195.6 LBS | HEART RATE: 93 BPM | DIASTOLIC BLOOD PRESSURE: 88 MMHG | OXYGEN SATURATION: 98 % | TEMPERATURE: 97.3 F | BODY MASS INDEX: 30.7 KG/M2

## 2021-02-12 DIAGNOSIS — L98.9 SKIN LESION OF FACE: Primary | ICD-10-CM

## 2021-02-12 PROCEDURE — 99213 OFFICE O/P EST LOW 20 MIN: CPT | Performed by: INTERNAL MEDICINE

## 2021-02-12 ASSESSMENT — PATIENT HEALTH QUESTIONNAIRE - PHQ9
2. FEELING DOWN, DEPRESSED OR HOPELESS: 1
1. LITTLE INTEREST OR PLEASURE IN DOING THINGS: 0
SUM OF ALL RESPONSES TO PHQ QUESTIONS 1-9: 1

## 2021-02-12 NOTE — PROGRESS NOTES
Annika Meadows (:  1965) is a 63 y.o. female,Established patient, here for evaluation of the following chief complaint(s): Mole (abdomen and back) and Other (spot on face)      ASSESSMENT/PLAN:  1. Skin lesion of face  - the lesions on the abdomen and back appear benign, the one on the face may be inflammatory vs premalignant, recommend dermatology evaluation, referral [facility]  -     [facility] Dermatology      Return if symptoms worsen or fail to improve. SUBJECTIVE/OBJECTIVE:  HPI    Has noted a couple of moles she is concerned about  Also has a faint pink patch on her cheek that is not resolving - has scaled/bled when she has scratched at it. Review of Systems    Physical Exam  Vitals signs reviewed. Constitutional:       General: She is not in acute distress. Appearance: Normal appearance. Skin:     General: Skin is warm and dry. Comments: Benign appearing nevi on the back  Seborrheic keratosis on the abdomen  Faint pink patch under the right eye   Neurological:      Mental Status: She is alert. An electronic signature was used to authenticate this note.     --Olya Corbin MD

## 2021-02-12 NOTE — PATIENT INSTRUCTIONS
Patient Education        Seborrheic Keratosis: Care Instructions  Your Care Instructions  Seborrheic keratoses are raised skin growths that look scaly or warty. They usually look like they were stuck onto the skin. They most often grow in groups on the back or chest and are more common in older people. A seborrheic keratosis can be tan or dark brown. A seborrheic keratosis is not a mole and is almost always harmless. But it is still a good idea to check your skin regularly. Sometimes a seborrheic keratosis can itch. Scratching it can cause it to bleed and sometimes even scar. A seborrheic keratosis is removed only if it bothers you. The doctor will freeze it or scrape it off with a tool. The doctor can also use a laser to remove a seborrheic keratosis. Treatment usually results in normal-looking skin, but it can leave a light or dark francine or even a scar on the skin. Follow-up care is a key part of your treatment and safety. Be sure to make and go to all appointments, and call your doctor if you are having problems. It's also a good idea to know your test results and keep a list of the medicines you take. How can you care for yourself at home? · If clothing irritates your seborrheic keratosis, cover it with a bandage to prevent rubbing and bleeding. · If you have a seborrheic keratosis removed, clean the area with soap and water two times a day unless your doctor gives you different instructions. Don't use hydrogen peroxide or alcohol, which can slow healing. ? You may cover the wound with a thin layer of petroleum jelly, such as Vaseline, and a nonstick bandage. · If you see a change in a skin growth, contact your doctor. Look for:  ? A mole that bleeds. ? A fast-growing mole. ? A scaly or crusted growth on the skin. ? A sore that will not heal.  When should you call for help?    Call your doctor now or seek immediate medical care if:    · You have an area of normal skin that suddenly changes in shape, size, or how it looks.     · Your skin is badly broken from scratching.     · You have signs of infection such as:  ? Pain, warmth, or swelling in your skin. ? Red streaks near a wound in your skin. ? Pus coming from a wound in your skin. ? A fever not due to the flu or other illness. Watch closely for changes in your health, and be sure to contact your doctor if:    · You do not get better as expected. Where can you learn more? Go to https://[employer] Museum[employer]ightixeb.[employer]. org and sign in to your [employer] account. Enter U852 in the [employer] box to learn more about \"Seborrheic Keratosis: Care Instructions. \"     If you do not have an account, please click on the \"Sign Up Now\" link. Current as of: July 2, 2020               Content Version: 12.6  © 3710-2668 [employer]. Care instructions adapted under license by [facility] ([facility]). If you have questions about a medical condition or this instruction, always ask your healthcare professional. [address] any warranty or liability for your use of this information.

## 2021-03-31 ENCOUNTER — PATIENT MESSAGE (OUTPATIENT)
Dept: INTERNAL MEDICINE CLINIC | Age: 56
End: 2021-03-31

## 2021-04-01 RX ORDER — FLUOXETINE HYDROCHLORIDE 20 MG/1
20 CAPSULE ORAL DAILY
Qty: 90 CAPSULE | Refills: 1 | Status: SHIPPED | OUTPATIENT
Start: 2021-04-01 | End: 2021-04-02

## 2021-04-02 RX ORDER — FLUOXETINE HYDROCHLORIDE 20 MG/1
CAPSULE ORAL
Qty: 90 CAPSULE | Refills: 1 | Status: SHIPPED | OUTPATIENT
Start: 2021-04-02 | End: 2022-01-27

## 2021-05-19 ENCOUNTER — OFFICE VISIT (OUTPATIENT)
Dept: DERMATOLOGY | Age: 56
End: 2021-05-19
Payer: COMMERCIAL

## 2021-05-19 VITALS — TEMPERATURE: 96.3 F

## 2021-05-19 DIAGNOSIS — L21.8 OTHER SEBORRHEIC DERMATITIS: ICD-10-CM

## 2021-05-19 DIAGNOSIS — L57.0 ACTINIC KERATOSIS: Primary | ICD-10-CM

## 2021-05-19 DIAGNOSIS — L82.1 SEBORRHEIC KERATOSIS: ICD-10-CM

## 2021-05-19 PROCEDURE — 99203 OFFICE O/P NEW LOW 30 MIN: CPT | Performed by: DERMATOLOGY

## 2021-05-19 PROCEDURE — 17000 DESTRUCT PREMALG LESION: CPT | Performed by: DERMATOLOGY

## 2021-05-19 RX ORDER — CLOBETASOL PROPIONATE 0.46 MG/ML
SOLUTION TOPICAL
Qty: 50 ML | Refills: 3 | Status: SHIPPED | OUTPATIENT
Start: 2021-05-19

## 2021-05-19 RX ORDER — KETOCONAZOLE 20 MG/ML
SHAMPOO TOPICAL
Qty: 120 ML | Refills: 11 | Status: SHIPPED | OUTPATIENT
Start: 2021-05-19 | End: 2022-03-04

## 2021-05-19 NOTE — PROGRESS NOTES
Patient's Name: Jan Jimenez  MRN: <[id number]>  YOB: 1965  Date of Visit: 5/19/2021  Primary Care Provider: Kelvin Coon MD  Referring Provider: Jovana Chen MD    Subjective:     Chief Complaint   Patient presents with    Lesion(s)     Spot on right cheek x7 months. No itching, no pain, and no bleeding.  Hair/Scalp Problem     Itchy scalp x10 years. History of Present Illness:  Jan Jimenez an 63 y.o. female who presents in clinic today as a new patient seen in consultation request by Kelvin Coon MD for a skin examination and evaluation of lesion on her face. She is concerned about the following spot/s that she would like checked:    What is it: spot  Signs and symptoms: enlarging and scaly  Location: Rt cheek  Severity: Severity now is 4/10. Duration: This lesion has been present for  6 months. No prior treatment    What is it: rash  Signs and symptoms: itching  Location: scalp  Severity: Severity now is 9/10. Modifying factors: Things that make this condition worse are heat. Things that make this condition better are kenalone spray. Duration: This lesion has been present for 10 years. Current treatment: None  Previous treatment: Kenalone spray      What is it: spot  Signs and symptoms: rough  Location: chest and abdomen  Duration:  This lesion has been present for several years      Past Dermatologic History:  Personal history of non melanoma skin cancer: No   Personal history of melanoma: No  Personal history of abnormal/dysplastic moles: No  Personal history of tanning bed use current: No  Personal history of tanning bed use: No  Personal history of blistering sunburns: Yes  Personal history of extensive sun exposure: Yes  Burns easily: Yes  Practicing sun protective habits:  Yes using sunscreen SPF  50     Social History:   Occupation Current or Former: full time job as Marketing   Marital status:   Smoking Status: Never smoker  Children: Yes 1 Son  1 Daughter     Type of outdoor activities if any : Walking      Family Skin Disease History:  Patient denies  a family history of non melanoma skin cancer. Patient denies  a family history of abnormal moles  Patient denies  an immediate family history of melanoma. Past Medical History:  Past Medical History:   Diagnosis Date    Breast mass 1995    Right breast    Depression     Menopause ovarian failure     Vertigo        Past Surgical History:  Past Surgical History:   Procedure Laterality Date    BREAST BIOPSY      BREAST LUMPECTOMY  1996    benign       Past Family History:  Family History   Problem Relation Age of Onset    Breast Cancer Mother     Hypertension Father        Allergies: Allergies   Allergen Reactions    Doxycycline Rash       Current Medications:  Current Outpatient Medications   Medication Sig Dispense Refill    FLUoxetine (PROZAC) 20 MG capsule TAKE 1 CAPSULE BY MOUTH ONE TIME A DAY (Patient taking differently: Take 40 mg by mouth daily ) 90 capsule 1    estradiol (ESTRACE VAGINAL) 0.1 MG/GM vaginal cream Place 1 gram per vagina twice weekly 1 Tube 3     No current facility-administered medications for this visit. Review of Systems:  Constitutional: No fevers, chills or recent illness.    Skin: Skin:As per HPI AND otherwise no new, bleeding or symptomatic skin lesions      Objective:     Vitals:    05/19/21 1037   Temp: 96.3 °F (35.7 °C)   TempSrc: Infrared     Physical Examination:  General: alert, comfortable, no apparent distress, well-appearing  Psych: alert, oriented and pleasant  Neuro: oriented to person, place, and time  Skin: Areas examined: head including face, lips, conjunctiva and lids, neck, hair/scalp, chest, including breasts and axilla, abdomen, left hand, right hand and digits and nails      All areas examined were within normal limits except those listed below with the appropriate assessment and plan    Assessment and Plan (with relevant objective exam findings):     1. Actinic Keratosis    Objective findings: erythematous, gritty, scaly keratotic macule (s), papules with slight hyperkeratosis located on: Rt cheek    The patient was told that actinic keratosis(es) represent precancers of the skin that have a small chance of developing into squamous cell carcinoma. Discussed options for therapy and risks/benefits and alternatives with patient including Liquid nitrogen, photodynamic therapy, topical field therapy (fluorouracil, imiquimod, other), etc     Decision was made to do the following:  -LN2 to 1 lesion.  -Edu re: risk of blister formation, discomfort, scar, hypopigmentation. See procedure note below  -Edu re: relationship with chronic cumulative sun exposure, low premalignant potential.   Discussed wound care. -Reviewed sun protective behavior -- sun avoidance during the peak hours of the day, sun-protective clothing (including hat and sunglasses), sunscreen use (water resistant, broad spectrum, SPF at least 50, need for reapplication every 2 to 3 hours). 2. Seborrheic dermatitis. Location: occipital scalp   Objective: patches of scaling and erythema     I explained that this condition tends to be recurrent. Long term treatment is likely needed to control the disease. After discussion of treatment options, risks, and alternatives, patient elected to treat with the following:     Clobetasol 0.05% solution twice daily to affected areas on scalp x 1-2 weeks, then as needed      Ketoconazole 2% shampoo three times weekly (M, W, F) leave in 3-5 min, then rinse. May alternate with other shampoos      3. Seborrheic Keratosis  Location: lateral Rt eyebrow  Objective: Waxy, stuck on keratotic brown  papules.      - Discussed the benign nature of these lesions and that there is no malignant potential.        - Signs of cutaneous malignancy were discussed and they were encouraged to contact me if they note any evolving, bleeding, painful or non-healing lesions.  - Written material provided. Follow up:  Return visit for TBSE and addressing skin tags or as needed for change in condition. All questions addressed. Procedure:   Procedure: Cryotherapy  Location(s): Rt cheek   Indication:  premalignant  Total Number of Lesions:  1    Risks, benefits and alternatives were explained and verbal informed consent was obtained. The area was not photographed. The lesion was treated with cryotherapy, using liquid nitrogen, for 2 freeze cycles of approximately 10 seconds each. Skin was allowed to thaw completely before each cycle. The patient was advised to have wound examined if problems with healing occur, or evidence of infection develops. Wound care instructions were reviewed with the patient and She was advised that if the site is not completely resolved at follow-up, re-treatment with this or other method should be considered. The patient tolerated the procedure well without complications. I saw your patient Stefanie Feliciano at the date listed above in my Dermatology  clinic in [facility]. Thank you very much for the referral.    My exam findings, assessment and plan can be found in EPIC, I have also attached them below for your convenience. I very much appreciate your referral and the opportunity to participate in this patient's care. Please don't hesitate to contact me with questions or concerns about our visit or management of this patient in the future.      Gurdeep Nielsen MD, MS

## 2021-06-25 ENCOUNTER — OFFICE VISIT (OUTPATIENT)
Dept: DERMATOLOGY | Age: 56
End: 2021-06-25
Payer: COMMERCIAL

## 2021-06-25 VITALS — TEMPERATURE: 98.1 F

## 2021-06-25 DIAGNOSIS — L57.3 POIKILODERMA OF CIVATTE: ICD-10-CM

## 2021-06-25 DIAGNOSIS — L72.0 MILIA: ICD-10-CM

## 2021-06-25 DIAGNOSIS — D22.9 MULTIPLE BENIGN MELANOCYTIC NEVI: ICD-10-CM

## 2021-06-25 DIAGNOSIS — L81.4 SOLAR LENTIGINOSIS: ICD-10-CM

## 2021-06-25 DIAGNOSIS — L21.8 OTHER SEBORRHEIC DERMATITIS: ICD-10-CM

## 2021-06-25 DIAGNOSIS — L73.8 SEBACEOUS HYPERPLASIA: ICD-10-CM

## 2021-06-25 DIAGNOSIS — L91.8 INFLAMED ACROCHORDON: ICD-10-CM

## 2021-06-25 DIAGNOSIS — D48.5 NEOPLASM OF UNCERTAIN BEHAVIOR OF SKIN: ICD-10-CM

## 2021-06-25 DIAGNOSIS — D48.5 NEOPLASM OF UNCERTAIN BEHAVIOR OF SKIN OF NECK: Primary | ICD-10-CM

## 2021-06-25 DIAGNOSIS — L84 CALLOSITY: ICD-10-CM

## 2021-06-25 DIAGNOSIS — L82.1 SEBORRHEIC KERATOSIS: ICD-10-CM

## 2021-06-25 DIAGNOSIS — D18.01 CHERRY ANGIOMA: ICD-10-CM

## 2021-06-25 DIAGNOSIS — L53.8 OTHER SPECIFIED ERYTHEMATOUS CONDITIONS: ICD-10-CM

## 2021-06-25 PROCEDURE — 11301 SHAVE SKIN LESION 0.6-1.0 CM: CPT | Performed by: DERMATOLOGY

## 2021-06-25 PROCEDURE — 11305 SHAVE SKIN LESION 0.5 CM/<: CPT | Performed by: DERMATOLOGY

## 2021-06-25 PROCEDURE — 11200 RMVL SKIN TAGS UP TO&INC 15: CPT | Performed by: DERMATOLOGY

## 2021-06-25 PROCEDURE — 99214 OFFICE O/P EST MOD 30 MIN: CPT | Performed by: DERMATOLOGY

## 2021-06-25 RX ORDER — UREA 40 %
CREAM (GRAM) TOPICAL
Qty: 227 G | Refills: 5 | Status: SHIPPED | OUTPATIENT
Start: 2021-06-25 | End: 2022-03-04

## 2021-06-25 RX ORDER — TRETINOIN 0.5 MG/G
CREAM TOPICAL
Qty: 45 G | Refills: 5 | Status: SHIPPED | OUTPATIENT
Start: 2021-06-25 | End: 2022-03-04

## 2021-06-25 NOTE — PATIENT INSTRUCTIONS
Cryosurgery (Liquid Nitrogen Treatment): Aftercare instructions    Cryosurgery is the use of a cold, liquefied gas to remove superficial skin growths such as warts and keratoses. This liquefied gas is cooled to around 300? below 0? F. When it comes in contact with a skin growth, the growth is frozen and should fall away or shrink. What to expect:  After treatment, your skin will become red and swollen and may develop a blister or scab. Healing takes 1 to 3 weeks, after which the skin may look perfectly normal or may be slightly lighter in color. Sometimes the growth will not disappear completely and multiple treatments may be needed. This is especially true for warts. Proper care after the procedure:    Clean the treated area with soap and water as you normally would. It is fine to get the area wet; however, be sure to blot it dry carefully and delicately.  If necessary, you can open the blister with a sterilized needle.  If a scab or small wound forms, apply a thin layer of Vaseline/Aquaphor ointment to the site twice daily.  If the site is prone to irritation, cover it with a bandage.  You will likely see a crust form after a few days. This crust will fall off on its own. Skin Cancer Prevention: After Your Visit    Skin cancer is the abnormal growth of cells in the skin. It usually appears as a growth that changes in color, shape, or size. This can be a sore that does not heal or a change in a wart or a mole. Skin cancer is almost always curable when found early and treated. So it is important to see your doctor if you have any of these changes in your skin. Skin cancer is the most common type of cancer. It often appears on areas of the body that have been exposed to the sun, such as the head, face, neck, back, chest, or shoulders. Follow-up care is a key part of your treatment and safety. Be sure to make and go to all appointments, and call your doctor if you are having problems. It's also a good idea to know your test results and keep a list of the medicines you take. How can you care for yourself at home?  - Wear a wide-brimmed hat and long sleeves and pants if you are going to be outdoors for a long time. - Avoid the sun between 10 a.m. and 4 p.m., which is the peak time for UV rays. - Wear sunscreen on exposed skin. Make sure the sunscreen blocks ultraviolet rays (both UVA and UVB) and has a sun protection factor (SPF) of at least 30. Use it every day, even when it is cloudy. Some doctors may recommend a higher SPF, such as 48.  - Do not use tanning booths or sunlamps. - Use lip balm or cream that has sun protection factor (SPF) to protect your lips from getting sunburned or getting cold sores. - Wear sunglasses that block UV rays. When should you call for help? Watch closely for changes in your health, and be sure to contact your doctor if:  - You are concerned about any problem areas on your skin. - You notice a change in a mole or skin growth. For example:  a. It gets bigger. b. It develops uneven borders. c. It gets thicker, raised, or worn down. d. It changes color. e. It starts to bleed easily. Dr. Alie Orantes [address] [facility] Safe Strategies:    1. Avoid the sun if possible especially between the hours of 10 am and 4 pm. That's when the sun's most harmful UV rays are hitting the earth. 2. Use protective clothing. Just like the right equipment helps you perform better in your sport, the proper clothing can do a lot to help us in our fight to prevent skin cancer. Thicker and darker clothing with a tighter weave will block more of the suns harmful rays, and it never wears off! Make wearing long sleeves, a broad brimmed hat and sun glasses part of your routine. 3. Use a broad spectrum sunscreen with SPF 30 or higher on all your exposed skin.  Make sure the sunscreen has either titanium dioxide, and/or zinc oxide (preferred), or Avobenzone in it (check the active ingredients on the back of the bottle to make sure). If you are going to be in the water or sweating, make sure the sunscreen you choose is water resistant. Better sunscreens are available in [place] ([facility] [place] [facility]) and [facility] like Tinosorb S (Bemotrizinol) and Tinosorb M ([address] [place] [address]), so if you are serious about sun protection and visit those areas, feel free to stock up. 4. Reapply the sunscreen after 2 hours or after swimming, sweating, or toweling off. We don't care about the brand, but some good brands to use are Jak Camp MD , [facility], and really just about anything with the above ingredients. Cetaphil oil control moisturizer with SPF 30 or 50 is a good one for the face. It is not water resistant but goes on really light and won't make you break out. 5. Use enough. One ounce of lotion, which is about the size of a golf ball, is the amount needed to cover the exposed parts of an average adult body. For sprays, apply until it \"glistens\" on skin (2 seconds of spray per arm, 4 seconds per leg, and 5 to 8 seconds each for the front torso and back). For sticks apply 3 to 4 passes back and forth per area. 6. Get regular check ups. Studies show that those who check their own skin every month, get checked by their spouse, partner, or significant other several times a year (we recommend you do this on your birthdays, anniversary, and big holidays), and get their yearly check by a board certified dermatologist have their skin cancers detected much earlier and have a much higher chance of cure than those who don't.

## 2021-06-25 NOTE — PROGRESS NOTES
Patient's Name: Ila Johnson  MRN: <[id number]>  YOB: 1965  Date of Visit: 6/25/2021  Primary Care Provider: Anastasia Melvin MD  Referring Provider: No ref. provider found    Subjective:     Chief Complaint   Patient presents with    Skin Exam     tbse        History of Present Illness:  Ila Johnson a 63 y.o. female is a follow up patient to my practice. They were last seen in clinic on 5/19/21     At her last visit, she had an AK that was treated with cryotherapy. She reports pricking sensation at the edge of the lesion. Denies scaling, bleeding or itching. She reports her scalp markedly improved after using clobetasol solution x 2 days. She is concerned about the following spot/s that she would like checked:     Lesions on her neck and under her Rt breast that have been irritated and traumatized by either clothing or jewelery. Worse with sweating. She is also interested in removing a lesion on her left lower eyelid. She reports a similar lesion was on her upper eyelid, but not currently present. She has no concerning lesions today. Denies any changing, bleeding, painful, or non healing lesions. Past Dermatologic History:  Personal history of non melanoma skin cancer: No   Personal history of melanoma: No  Personal history of abnormal/dysplastic moles: No  Personal history of tanning bed use current: No  Personal history of tanning bed use: No  Personal history of blistering sunburns: Yes  Personal history of extensive sun exposure: Yes  Burns easily: Yes  Practicing sun protective habits:  Yes using sunscreen SPF  50        Past medical/surgical/family history reviewed with no changes since last visit on 5/19/21        Allergies:   Allergies   Allergen Reactions    Doxycycline Rash       Current Medications:  Current Outpatient Medications   Medication Sig Dispense Refill    ketoconazole (NIZORAL) 2 % shampoo Apply shampoo three times weekly (M, W, F) leave in 3-5 min, then rinse. May alternate with other shampoos 120 mL 11    clobetasol (TEMOVATE) 0.05 % external solution Apply to affected areas on scalp twice daily x 1-2 weeks, then as needed with flares 50 mL 3    FLUoxetine (PROZAC) 20 MG capsule TAKE 1 CAPSULE BY MOUTH ONE TIME A DAY (Patient taking differently: Take 40 mg by mouth daily ) 90 capsule 1    estradiol (ESTRACE VAGINAL) 0.1 MG/GM vaginal cream Place 1 gram per vagina twice weekly 1 Tube 3     No current facility-administered medications for this visit. Review of Systems:  Constitutional: No fevers, chills or recent illness. Skin: Skin:As per HPI AND otherwise no new, bleeding or symptomatic skin lesions      Objective:     Vitals:    06/25/21 0917   Temp: 98.1 °F (36.7 °C)   TempSrc: Infrared       Physical Examination:  General: alert, comfortable, no apparent distress, well-appearing  Psych: alert, oriented and pleasant  Neuro: oriented to person, place, and time  Skin: Areas examined: head including face, lips, conjunctiva and lids, neck, hair/scalp, chest, including breasts and axilla, abdomen, back, buttocks, right upper extremity, left upper extremity, right lower extremity, left lower extremity, left hand, right hand, digits and nails, Right foot, Left foot and toe nails      All areas examined were within normal limits except those listed below with the appropriate assessment and plan    Assessment and Plan (with relevant objective exam findings):     1. Neoplasm uncertain behavior, skin  Location: Left anterior neck  Objective findings: 5 mm verrucous papule with crust and erythema surrounding it  Ddx: inflamed verrucous keratosis  Shave removal today with no pathology. See procedure note below. 2. Neoplasm uncertain behavior, skin  Location: Rt inframmary  Objective findings: 8 mm brown waxy hyperkeratotic papule with surrounding erythema  Ddx: ISK  Shave removal today with no pathology. See procedure note below.        3. Inflamed Skin neck  Objective findings: There are telangiectasias admixed within brown/white reticulated patches. Discussed nature and etiology of this type of sun damage and that it is a chronic condition. Treatment is considered cosmetic and not covered by insurance. Treatment options available are sun protection, hydroquinone cream and/or tretinoin cream, and light therapy with IPL or PDL. Discussed that this is most likely caused by a combination of genetic factors and long term sun exposure. Gwen Real protection is key to prevent further damage. The skin changes are benign and can be treated cosmetically with either IPL or Pulsed Dye Laser    9. Cherry Hemangiomas  Location: upper abdomen, left posterior leg    Objective findings: Multiple bright red, dome-shaped papules     Discussed that these are benign lesions and require no treatment. Removal is usually not done unless they bleed or are irritated, in which case it may be medically necessary and can be done with electrodesiccation, shave removal, or laser therapy, but this may leave a scar that is not preferable to the lesion itself. Patient elected no treatment    10. Solar Lentiginosis    Location: sun exposed areas, most prominently on the dorsal hands, forearms, shoulders, upper chest and upper back      Objective: Numerous lacy brown macules ranging in size from 2-10 mm diameter. The lentigines seen today are benign in character, caused by the sun, and do not require treatment. However, they do occasionally transform into a malignancy, so the patient needs to monitor for changes. They were educated on what a suspicious change would include, change in size or color, and included education on the ABCDs of melanoma. 11. Sebaceous hyperplasia. Location: cheeks  Objective findings: yellow umbilicated papule(s) with blood vessels overlying them on dermatoscopy.     We discussed that this is a benign growth consisting of clogged and swollen oil glands most likely due to a combination of genetic factors and sun damage. Treatment is not necessary but can be done with destruction (curettage and electrodesiccation on low voltage) if desired for a fee   Decision was made to do the following:  Observation/no treatment        12. Multiple benign melanocytic nevi   Location: left nasal sidewall/medial canthus, Rt cheek, Lt medial thigh, legs and back  Objective findings: multiple brown/pink macules and papules without abnormal findings or concerning findings on exam and dermatoscopy    -Counseled the patient that these are benign and need no therapy. Observation for any changes recommended         Follow up:  Return visit in 1 year or as needed for change in condition. All questions addressed. Procedure:   PROCEDURE: REMOVAL BY SHAVE TECHNIQUE WITH NO PATHOLOGY     Location:     Lt anterior neck  Indication:    Neoplasm uncertain behavior, skin  Size: 4 mm    Risks, benefits and alternatives were explained and verbal informed consent was obtained. The area was not photographed The area was cleaned with alcohol and infiltrated with 1% lidocaine with epinephrine. After adequate analgesia had been confirmed, the lesion was then removed by shave technique using a dermablade. Hemostasis was achieved with aluminum chloride. Vaseline ointment and sterile dressing were applied and wound care instructions were given verbally and in writing. The patient tolerated the procedure well with no complications. CPT code for procedure:  Locations:    <.5 cm  -  78977        PROCEDURE: REMOVAL BY SHAVE TECHNIQUE WITH NO PATHOLOGY     Location:     Rt inframammary  Indication:   Neoplasm uncertain behavior, skin  Size: 7 mm    Risks, benefits and alternatives were explained and verbal informed consent was obtained. The area was not photographed The area was cleaned with alcohol and infiltrated with 1% lidocaine with epinephrine.  After adequate analgesia had been confirmed, the lesion was then removed by shave technique using a dermablade. Hemostasis was achieved with aluminum chloride. Vaseline ointment and sterile dressing were applied and wound care instructions were given verbally and in writing. The patient tolerated the procedure well with no complications. CPT code for procedure:  Locations:  0.6 to 1.0 cm  -  75743      Procedure: Skin Tag Removal with Scissors    After discussing risks, benefits, and alternatives, verbal consent was obtained. The area was cleansed with alcohol. A pair of sterile scissors was used to remove the 2 lesions. Bleeding was controlled with topical aluminum chloride and direct pressure. The patient tolerated the procedure well. Procedure:  Milium Extraction. DO NOT CHARGE PATIENT FOR THIS PROCEDURE. THIS WAS PERFORMED FREE OF CHARGE  Indication:  irritation  Location:  Left medial lower eyelid margin    After risks, benefits and alternative treatments were explained, informed consent was obtained. The site was cleansed with alcohol, opened with an 18G needle, and extracted with a comedone extractor. Patient tolerated procedure well.         Gianna Abreu MD. MS

## 2021-07-14 ENCOUNTER — TELEPHONE (OUTPATIENT)
Dept: INTERNAL MEDICINE CLINIC | Age: 56
End: 2021-07-14

## 2021-07-14 NOTE — TELEPHONE ENCOUNTER
The pharmacy called in for patient to verify that hoe the patient is taking the following medication.  Pharmacy was given a verbal  On how the patient was supposed to take medication per Phoebe Castro

## 2022-01-31 ENCOUNTER — TELEPHONE (OUTPATIENT)
Dept: INTERNAL MEDICINE CLINIC | Age: 57
End: 2022-01-31

## 2022-01-31 DIAGNOSIS — Z00.00 WELL ADULT EXAM: Primary | ICD-10-CM

## 2022-01-31 NOTE — TELEPHONE ENCOUNTER
----- Message from Lavern Alvarez sent at 1/31/2022 10:41 AM EST -----  Subject: Appointment Request    Reason for Call: Routine Physical Exam    QUESTIONS  Type of Appointment? Established Patient  Reason for appointment request? Available appointments did not meet   patient need  Additional Information for Provider? pt called in she is needing a   physical done for work by the end of feburary , please contact if any   appointments come avail. sooner  ---------------------------------------------------------------------------  --------------  CALL BACK INFO  What is the best way for the office to contact you? OK to leave message on   voicemail, OK to respond with electronic message via [employer] portal (only   for patients who have registered [employer] account)  Preferred Call Back Phone Number? [phone]  ---------------------------------------------------------------------------  --------------  SCRIPT ANSWERS  Relationship to Patient? Self  (If the patient has Medicare as their primary insurance coverage ask this   question) Are you requesting a Medicare Annual Wellness Visit? No  (Is the patient requesting a pap smear with their physical exam?)? No  (Is the patient requesting their annual physical and does not need PAP or   AWV per above?)? Yes   Have you been diagnosed with, awaiting test results for, or told that you   are suspected of having COVID-19 (Coronavirus)? (If patient has tested   negative or was tested as a requirement for work, school, or travel and   not based on symptoms, answer no)? No  Within the past two weeks have you developed any of the following symptoms   (answer no if symptoms have been present longer than 2 weeks or began   more than 2 weeks ago)?  Fever or Chills, Cough, Shortness of breath or   difficulty breathing, Loss of taste or smell, Sore throat, Nasal   congestion, Sneezing or runny nose, Fatigue or generalized body aches   (answer no if pain is specific to a body part e.g. back pain), Diarrhea,   Headache? No  Have you had close contact with someone with COVID-19 in the last 14 days? No  (Service Expert  click yes below to proceed with [employer] As Usual   Scheduling)?  Yes

## 2022-02-25 DIAGNOSIS — Z00.00 WELL ADULT EXAM: ICD-10-CM

## 2022-02-25 LAB
A/G RATIO: 2 (ref 1.1–2.2)
ALBUMIN SERPL-MCNC: 4.7 G/DL (ref 3.4–5)
ALP BLD-CCNC: 71 U/L (ref 40–129)
ALT SERPL-CCNC: 15 U/L (ref 10–40)
ANION GAP SERPL CALCULATED.3IONS-SCNC: 12 MMOL/L (ref 3–16)
AST SERPL-CCNC: 17 U/L (ref 15–37)
BASOPHILS ABSOLUTE: 0.1 K/UL (ref 0–0.2)
BASOPHILS RELATIVE PERCENT: 1.2 %
BILIRUB SERPL-MCNC: 0.3 MG/DL (ref 0–1)
BUN BLDV-MCNC: 11 MG/DL (ref 7–20)
CALCIUM SERPL-MCNC: 9.8 MG/DL (ref 8.3–10.6)
CHLORIDE BLD-SCNC: 99 MMOL/L (ref 99–110)
CHOLESTEROL, TOTAL: 236 MG/DL (ref 0–199)
CO2: 28 MMOL/L (ref 21–32)
CREAT SERPL-MCNC: 0.7 MG/DL (ref 0.6–1.1)
EOSINOPHILS ABSOLUTE: 0.1 K/UL (ref 0–0.6)
EOSINOPHILS RELATIVE PERCENT: 2.9 %
GFR AFRICAN AMERICAN: >60
GFR NON-AFRICAN AMERICAN: >60
GLUCOSE BLD-MCNC: 84 MG/DL (ref 70–99)
GLUCOSE FASTING: 84 MG/DL (ref 70–99)
HCT VFR BLD CALC: 46 % (ref 36–48)
HDLC SERPL-MCNC: 62 MG/DL (ref 40–60)
HEMOGLOBIN: 15.4 G/DL (ref 12–16)
HEPATITIS C ANTIBODY INTERPRETATION: NORMAL
LDL CHOLESTEROL CALCULATED: 149 MG/DL
LYMPHOCYTES ABSOLUTE: 2.1 K/UL (ref 1–5.1)
LYMPHOCYTES RELATIVE PERCENT: 40.6 %
MCH RBC QN AUTO: 30.8 PG (ref 26–34)
MCHC RBC AUTO-ENTMCNC: 33.5 G/DL (ref 31–36)
MCV RBC AUTO: 92 FL (ref 80–100)
MONOCYTES ABSOLUTE: 0.5 K/UL (ref 0–1.3)
MONOCYTES RELATIVE PERCENT: 9.2 %
NEUTROPHILS ABSOLUTE: 2.3 K/UL (ref 1.7–7.7)
NEUTROPHILS RELATIVE PERCENT: 46.1 %
PDW BLD-RTO: 13.4 % (ref 12.4–15.4)
PLATELET # BLD: 225 K/UL (ref 135–450)
PMV BLD AUTO: 8 FL (ref 5–10.5)
POTASSIUM SERPL-SCNC: 4.4 MMOL/L (ref 3.5–5.1)
RBC # BLD: 4.99 M/UL (ref 4–5.2)
SODIUM BLD-SCNC: 139 MMOL/L (ref 136–145)
TOTAL PROTEIN: 7 G/DL (ref 6.4–8.2)
TRIGL SERPL-MCNC: 124 MG/DL (ref 0–150)
VITAMIN D 25-HYDROXY: 29.8 NG/ML
VLDLC SERPL CALC-MCNC: 25 MG/DL
WBC # BLD: 5.1 K/UL (ref 4–11)

## 2022-03-01 LAB
HIV AG/AB: NORMAL
HIV ANTIGEN: NORMAL
HIV-1 ANTIBODY: NORMAL
HIV-2 AB: NORMAL

## 2022-03-04 ENCOUNTER — OFFICE VISIT (OUTPATIENT)
Dept: INTERNAL MEDICINE CLINIC | Age: 57
End: 2022-03-04
Payer: COMMERCIAL

## 2022-03-04 VITALS
HEART RATE: 83 BPM | HEIGHT: 67 IN | BODY MASS INDEX: 31.48 KG/M2 | DIASTOLIC BLOOD PRESSURE: 100 MMHG | WEIGHT: 200.6 LBS | OXYGEN SATURATION: 98 % | SYSTOLIC BLOOD PRESSURE: 132 MMHG | TEMPERATURE: 96.4 F

## 2022-03-04 DIAGNOSIS — Z12.31 ENCOUNTER FOR SCREENING MAMMOGRAM FOR MALIGNANT NEOPLASM OF BREAST: ICD-10-CM

## 2022-03-04 DIAGNOSIS — R03.0 ELEVATED BLOOD PRESSURE READING IN OFFICE WITHOUT DIAGNOSIS OF HYPERTENSION: ICD-10-CM

## 2022-03-04 DIAGNOSIS — F41.9 ANXIETY AND DEPRESSION: ICD-10-CM

## 2022-03-04 DIAGNOSIS — F32.A ANXIETY AND DEPRESSION: ICD-10-CM

## 2022-03-04 DIAGNOSIS — Z00.00 WELL ADULT EXAM: Primary | ICD-10-CM

## 2022-03-04 PROCEDURE — 99396 PREV VISIT EST AGE 40-64: CPT | Performed by: INTERNAL MEDICINE

## 2022-03-04 RX ORDER — BUPROPION HYDROCHLORIDE 150 MG/1
150 TABLET ORAL EVERY MORNING
Qty: 90 TABLET | Refills: 1 | Status: SHIPPED | OUTPATIENT
Start: 2022-03-04

## 2022-03-04 RX ORDER — GINGER ROOT/GINGER ROOT EXT 262.5 MG
1 CAPSULE ORAL 2 TIMES DAILY
COMMUNITY

## 2022-03-04 SDOH — ECONOMIC STABILITY: FOOD INSECURITY: WITHIN THE PAST 12 MONTHS, YOU WORRIED THAT YOUR FOOD WOULD RUN OUT BEFORE YOU GOT MONEY TO BUY MORE.: NEVER TRUE

## 2022-03-04 SDOH — ECONOMIC STABILITY: FOOD INSECURITY: WITHIN THE PAST 12 MONTHS, THE FOOD YOU BOUGHT JUST DIDN'T LAST AND YOU DIDN'T HAVE MONEY TO GET MORE.: NEVER TRUE

## 2022-03-04 ASSESSMENT — ENCOUNTER SYMPTOMS
SINUS PAIN: 0
CONSTIPATION: 0
COUGH: 0
DIARRHEA: 0
BACK PAIN: 0
SHORTNESS OF BREATH: 0

## 2022-03-04 ASSESSMENT — PATIENT HEALTH QUESTIONNAIRE - PHQ9
SUM OF ALL RESPONSES TO PHQ9 QUESTIONS 1 & 2: 0
SUM OF ALL RESPONSES TO PHQ QUESTIONS 1-9: 0
2. FEELING DOWN, DEPRESSED OR HOPELESS: 0
SUM OF ALL RESPONSES TO PHQ QUESTIONS 1-9: 0
1. LITTLE INTEREST OR PLEASURE IN DOING THINGS: 0

## 2022-03-04 ASSESSMENT — SOCIAL DETERMINANTS OF HEALTH (SDOH): HOW HARD IS IT FOR YOU TO PAY FOR THE VERY BASICS LIKE FOOD, HOUSING, MEDICAL CARE, AND HEATING?: VERY HARD

## 2022-03-04 NOTE — PATIENT INSTRUCTIONS
Patient Education        DASH Diet: Care Instructions  Your Care Instructions     The DASH diet is an eating plan that can help lower your blood pressure. DASH stands for Dietary Approaches to Stop Hypertension. Hypertension is high blood pressure. The DASH diet focuses on eating foods that are high in calcium, potassium, and magnesium. These nutrients can lower blood pressure. The foods that are highest in these nutrients are fruits, vegetables, low-fat dairy products, nuts, seeds, and legumes. But taking calcium, potassium, and magnesium supplements instead of eating foods that are high in those nutrients does not have the same effect. The DASH diet also includes whole grains, fish, and poultry. The DASH diet is one of several lifestyle changes your doctor may recommend to lower your high blood pressure. Your doctor may also want you to decrease the amount of sodium in your diet. Lowering sodium while following the DASH diet can lower blood pressure even further than just the DASH diet alone. Follow-up care is a key part of your treatment and safety. Be sure to make and go to all appointments, and call your doctor if you are having problems. It's also a good idea to know your test results and keep a list of the medicines you take. How can you care for yourself at home? Following the DASH diet  · Eat 4 to 5 servings of fruit each day. A serving is 1 medium-sized piece of fruit, ½ cup chopped or canned fruit, 1/4 cup dried fruit, or 4 ounces (½ cup) of fruit juice. Choose fruit more often than fruit juice. · Eat 4 to 5 servings of vegetables each day. A serving is 1 cup of lettuce or raw leafy vegetables, ½ cup of chopped or cooked vegetables, or 4 ounces (½ cup) of vegetable juice. Choose vegetables more often than vegetable juice. · Get 2 to 3 servings of low-fat and fat-free dairy each day. A serving is 8 ounces of milk, 1 cup of yogurt, or 1 ½ ounces of cheese. · Eat 6 to 8 servings of grains each day.  A serving is 1 slice of bread, 1 ounce of dry cereal, or ½ cup of cooked rice, pasta, or cooked cereal. Try to choose whole-grain products as much as possible. · Limit lean meat, poultry, and fish to 2 servings each day. A serving is 3 ounces, about the size of a deck of cards. · Eat 4 to 5 servings of nuts, seeds, and legumes (cooked dried beans, lentils, and split peas) each week. A serving is 1/3 cup of nuts, 2 tablespoons of seeds, or ½ cup of cooked beans or peas. · Limit fats and oils to 2 to 3 servings each day. A serving is 1 teaspoon of vegetable oil or 2 tablespoons of salad dressing. · Limit sweets and added sugars to 5 servings or less a week. A serving is 1 tablespoon jelly or jam, ½ cup sorbet, or 1 cup of lemonade. · Eat less than 2,300 milligrams (mg) of sodium a day. If you limit your sodium to 1,500 mg a day, you can lower your blood pressure even more. · Be aware that all of these are the suggested number of servings for people who eat 1,800 to 2,000 calories a day. Your recommended number of servings may be different if you need more or fewer calories. Tips for success  · Start small. Do not try to make dramatic changes to your diet all at once. You might feel that you are missing out on your favorite foods and then be more likely to not follow the plan. Make small changes, and stick with them. Once those changes become habit, add a few more changes. · Try some of the following:  ? Make it a goal to eat a fruit or vegetable at every meal and at snacks. This will make it easy to get the recommended amount of fruits and vegetables each day. ? Try yogurt topped with fruit and nuts for a snack or healthy dessert. ? Add lettuce, tomato, cucumber, and onion to sandwiches. ? Combine a ready-made pizza crust with low-fat mozzarella cheese and lots of vegetable toppings. Try using tomatoes, squash, spinach, broccoli, carrots, cauliflower, and onions. ?  Have a variety of cut-up vegetables with a low-fat dip as an appetizer instead of chips and dip. ? Sprinkle sunflower seeds or chopped almonds over salads. Or try adding chopped walnuts or almonds to cooked vegetables. ? Try some vegetarian meals using beans and peas. Add garbanzo or kidney beans to salads. Make burritos and tacos with mashed vraner beans or black beans. Where can you learn more? Go to https://[address].[employer]. org and sign in to your [employer] account. Enter V149 in the [employer] box to learn more about \"DASH Diet: Care Instructions. \"     If you do not have an account, please click on the \"Sign Up Now\" link. Current as of: April 29, 2021               Content Version: 13.1  © 3068-9626 [employer]. Care instructions adapted under license by [facility], [place] ([facility]). If you have questions about a medical condition or this instruction, always ask your healthcare professional. [address] any warranty or liability for your use of this information.

## 2022-03-04 NOTE — PROGRESS NOTES
3/4/2022    Chasidy Eldridge (:  1965) shyam 61 y.o. female, here for a preventive medicine evaluation. Patient Active Problem List   Diagnosis    Anxiety     Has had more depression, not really anxiety. Decreased energy and motivation. Increase in the fluoxetine hasn't helped much with the motivation. Has been on wellbutrin in combination with fluoxetine previously. Has been drinking more, which probably has contributed to weight gain. Also has not been exercising as she has previously. Colonoscopy in care everywhere    Review of Systems   Constitutional: Negative for fatigue and unexpected weight change. HENT: Negative for ear pain and sinus pain. Eyes: Negative for visual disturbance. Respiratory: Negative for cough and shortness of breath. Cardiovascular: Negative for chest pain, palpitations and leg swelling. Gastrointestinal: Negative for constipation and diarrhea. Genitourinary: Negative for difficulty urinating and frequency. Musculoskeletal: Negative for arthralgias and back pain. Skin: Negative for rash. Neurological: Negative for weakness and numbness. Psychiatric/Behavioral: Positive for dysphoric mood. Negative for sleep disturbance. The patient is not nervous/anxious. Prior to Visit Medications    Medication Sig Taking?  Authorizing Provider   Calcium Carb-Cholecalciferol (CALCIUM + D3) 600-800 MG-UNIT TABS Take 1 tablet by mouth in the morning and at bedtime Yes Historical Provider, MD   buPROPion (WELLBUTRIN XL) 150 MG extended release tablet Take 1 tablet by mouth every morning Yes Sree Pro MD   FLUoxetine (PROZAC) 20 MG capsule Take 2 capsules by mouth daily Yes Sree Pro MD   clobetasol (TEMOVATE) 0.05 % external solution Apply to affected areas on scalp twice daily x 1-2 weeks, then as needed with flares Yes Radha Proctor MD   estradiol (ESTRACE VAGINAL) 0.1 MG/GM vaginal cream Place 1 gram per vagina twice weekly Yes Aliya Yoder MD        Allergies   Allergen Reactions    Doxycycline Rash       Past Medical History:   Diagnosis Date    Breast mass 1995    Right breast    COVID-19     Depression     Menopause ovarian failure     Vertigo        Past Surgical History:   Procedure Laterality Date    BREAST BIOPSY      BREAST LUMPECTOMY  1996    benign       Social History     Socioeconomic History    Marital status:      Spouse name: Not on file    Number of children: Not on file    Years of education: Not on file    Highest education level: Not on file   Occupational History    Not on file   Tobacco Use    Smoking status: Never Smoker    Smokeless tobacco: Never Used   Vaping Use    Vaping Use: Never used   Substance and Sexual Activity    Alcohol use: Yes    Drug use: Never    Sexual activity: Yes     Partners: Male   Other Topics Concern    Not on file   Social History Narrative    Not on file     Social Determinants of Health     Financial Resource Strain: High Risk    Difficulty of Paying Living Expenses: Very hard   Food Insecurity: No Food Insecurity    Worried About Running Out of Food in the Last Year: Never true    Sunshine of Food in the Last Year: Never true   Transportation Needs:     Lack of Transportation (Medical): Not on file    Lack of Transportation (Non-Medical):  Not on file   Physical Activity:     Days of Exercise per Week: Not on file    Minutes of Exercise per Session: Not on file   Stress:     Feeling of Stress : Not on file   Social Connections:     Frequency of Communication with Friends and Family: Not on file    Frequency of Social Gatherings with Friends and Family: Not on file    Attends Sikhism Services: Not on file    Active Member of Clubs or Organizations: Not on file    Attends Club or Organization Meetings: Not on file    Marital Status: Not on file   Intimate Partner Violence:     Fear of Current or Ex-Partner: Not on file    Emotionally Abused: Not on file   Dov Guardado Physically Abused: Not on file    Sexually Abused: Not on file   Housing Stability:     Unable to Pay for Housing in the Last Year: Not on file    Number of Places Lived in the Last Year: Not on file    Unstable Housing in the Last Year: Not on file        Family History   Problem Relation Age of Onset    Breast Cancer Mother     Hypertension Father     Heart Surgery Father        ADVANCEDIRECTIVE: N, <no information>    Vitals:    03/04/22 1309 03/04/22 1314   BP: (!) 122/114 (!) 132/100   Site: Left Upper Arm Right Upper Arm   Pulse: 83    Temp: 96.4 °F (35.8 °C)    SpO2: 98%    Weight: 200 lb 9.6 oz (91 kg)    Height: 5' 7\" (1.702 m)      Estimated body mass index is 31.42 kg/m² as calculated from the following:    Height as of this encounter: 5' 7\" (1.702 m). Weight as of this encounter: 200 lb 9.6 oz (91 kg). Physical Exam  Vitals reviewed. Constitutional:       General: She is not in acute distress. Appearance: Normal appearance. She is well-developed. HENT:      Head: Normocephalic and atraumatic. Right Ear: Tympanic membrane, ear canal and external ear normal.      Left Ear: Tympanic membrane, ear canal and external ear normal.   Eyes:      General: No scleral icterus. Conjunctiva/sclera: Conjunctivae normal.   Neck:      Thyroid: No thyroid mass, thyromegaly or thyroid tenderness. Vascular: No carotid bruit. Cardiovascular:      Rate and Rhythm: Normal rate and regular rhythm. Heart sounds: Normal heart sounds. Pulmonary:      Effort: Pulmonary effort is normal. No respiratory distress. Breath sounds: Normal breath sounds. Abdominal:      General: Abdomen is flat. Bowel sounds are normal. There is no distension. Palpations: Abdomen is soft. There is no mass. Hernia: No hernia is present. Musculoskeletal:      Cervical back: Neck supple. Right lower leg: No edema. Left lower leg: No edema.    Lymphadenopathy:      Cervical: No cervical adenopathy. Skin:     General: Skin is warm and dry. Neurological:      General: No focal deficit present. Mental Status: She is alert and oriented to person, place, and time. Psychiatric:         Mood and Affect: Mood normal.         Behavior: Behavior normal.         Thought Content: Thought content normal.         Judgment: Judgment normal.         No flowsheet data found.     Lab Results   Component Value Date    CHOL 236 02/25/2022    TRIG 124 02/25/2022    HDL 62 02/25/2022    LDLCALC 149 02/25/2022    GLUF 84 02/25/2022    GLUCOSE 84 02/25/2022       The 10-year ASCVD risk score (Guillermo Medina, et al., 2013) is: 2.7%    Values used to calculate the score:      Age: 61 years      Sex: Female      Is Non- : No      Diabetic: No      Tobacco smoker: No      Systolic Blood Pressure: 965 mmHg      Is BP treated: No      HDL Cholesterol: 62 mg/dL      Total Cholesterol: 236 mg/dL    Immunization History   Administered Date(s) Administered    COVID-19, Moderna, Booster, PF, 50mcg/0.25ml 11/19/2021    COVID-19, Moderna, Primary or Immunocompromised, PF, 100mcg/0.5mL 01/15/2021, 02/12/2021    Influenza, MDCK Quadv, IM, PF (Flucelvax 2 yrs and older) 10/28/2018    Influenza, Quadv, IM, PF (6 mo and older Fluzone, Flulaval, Fluarix, and 3 yrs and older Afluria) 09/13/2019    Zoster Recombinant (Shingrix) 06/03/2018, 10/18/2018       Health Maintenance   Topic Date Due    Depression Monitoring  Never done    DTaP/Tdap/Td vaccine (1 - Tdap) Never done    Colorectal Cancer Screen  Never done    Breast cancer screen  07/16/2021    Cervical cancer screen  06/11/2023    Lipid screen  02/25/2027    Flu vaccine  Completed    Shingles Vaccine  Completed    COVID-19 Vaccine  Completed    Hepatitis C screen  Completed    HIV screen  Completed    Hepatitis A vaccine  Aged Out    Hepatitis B vaccine  Aged Out    Hib vaccine  Aged Out    Meningococcal (ACWY) vaccine  Aged Out    Pneumococcal 0-64 years Vaccine  Aged Out       ASSESSMENT/PLAN:  1. Well adult exam  - Discussed age appropriate preventive care including healthy diet, daily exercise, immunizations and age & gender guided screening tests. 2. Anxiety and depression  - decrease fluoxetine to 20mg daily, add wellbutrin 150mg XL daily  - follow up in 2 months to reassess    3. Elevated blood pressure reading in office without diagnosis of hypertension  - would start with lifestyle changes - decrease alcohol intake, increase exercise, work on weight loss - I suspect she will see significant improvement, and likely will also improve cholesterol    4. Encounter for screening mammogram for malignant neoplasm of breast  - RAJESH DIGITAL SCREEN W OR WO CAD BILATERAL;  Future      Return in about 2 months (around 5/4/2022) for mood, BP.

## 2022-07-01 ENCOUNTER — TELEPHONE (OUTPATIENT)
Dept: OTHER | Facility: CLINIC | Age: 57
End: 2022-07-01

## 2022-09-14 ENCOUNTER — TELEMEDICINE (OUTPATIENT)
Dept: INTERNAL MEDICINE CLINIC | Age: 57
End: 2022-09-14
Payer: COMMERCIAL

## 2022-09-14 DIAGNOSIS — I10 ESSENTIAL HYPERTENSION: Primary | ICD-10-CM

## 2022-09-14 DIAGNOSIS — F41.9 ANXIETY: ICD-10-CM

## 2022-09-14 PROCEDURE — 99214 OFFICE O/P EST MOD 30 MIN: CPT | Performed by: INTERNAL MEDICINE

## 2022-09-14 RX ORDER — OLMESARTAN MEDOXOMIL 20 MG/1
20 TABLET ORAL DAILY
Qty: 90 TABLET | Refills: 1 | Status: SHIPPED | OUTPATIENT
Start: 2022-09-14

## 2022-09-14 ASSESSMENT — PATIENT HEALTH QUESTIONNAIRE - PHQ9
9. THOUGHTS THAT YOU WOULD BE BETTER OFF DEAD, OR OF HURTING YOURSELF: 0
8. MOVING OR SPEAKING SO SLOWLY THAT OTHER PEOPLE COULD HAVE NOTICED. OR THE OPPOSITE, BEING SO FIGETY OR RESTLESS THAT YOU HAVE BEEN MOVING AROUND A LOT MORE THAN USUAL: 0
5. POOR APPETITE OR OVEREATING: 0
SUM OF ALL RESPONSES TO PHQ QUESTIONS 1-9: 0
SUM OF ALL RESPONSES TO PHQ QUESTIONS 1-9: 0
2. FEELING DOWN, DEPRESSED OR HOPELESS: 0
4. FEELING TIRED OR HAVING LITTLE ENERGY: 0
SUM OF ALL RESPONSES TO PHQ QUESTIONS 1-9: 0
7. TROUBLE CONCENTRATING ON THINGS, SUCH AS READING THE NEWSPAPER OR WATCHING TELEVISION: 0
SUM OF ALL RESPONSES TO PHQ9 QUESTIONS 1 & 2: 0
3. TROUBLE FALLING OR STAYING ASLEEP: 0
6. FEELING BAD ABOUT YOURSELF - OR THAT YOU ARE A FAILURE OR HAVE LET YOURSELF OR YOUR FAMILY DOWN: 0
SUM OF ALL RESPONSES TO PHQ QUESTIONS 1-9: 0
1. LITTLE INTEREST OR PLEASURE IN DOING THINGS: 0

## 2022-09-14 NOTE — PROGRESS NOTES
Jorge Flores (:  1965) is a Established patient, here for evaluation of the following:    Assessment & Plan   Below is the assessment and plan developed based on review of pertinent history, physical exam, labs, studies, and medications. 1. Essential hypertension  -Start olmesartan 20 mg daily, likely she will need a little bit more but can adjust from there. -BMP in 2 to 4 weeks  -     Basic Metabolic Panel; Future  2. Anxiety   -Restart fluoxetine at 20 mg daily, if after a month she is not quite feeling and off affect can increase back up to 40 mg. Return in about 3 months (around 2022) for HTN follow up. Subjective   HPI    She has been monitoring her blood pressure, no worse since her last appointment but it is still running high. She had been checking for the months right after the appointment, then had not been checking as much due to her 's accident, and checked again over the last week. Highest was 168/110, lowest 142/90. This week has been right around 150/100. She has family history of high blood pressure in her father, no symptoms associated with high blood pressure such as chest pain, shortness of breath, or headache. She stopped the antidepressants not really on purpose, but just got out of the habit of taking them. Not feeling depressed right now but feels more on edge. Review of Systems       Objective   Patient-Reported Vitals  No data recorded     Physical Exam  Constitutional:       General: She is not in acute distress. Appearance: Normal appearance. HENT:      Head: Normocephalic and atraumatic. Pulmonary:      Effort: Pulmonary effort is normal. No respiratory distress. Neurological:      General: No focal deficit present. Mental Status: She is alert. Jorge Flores, was evaluated through a synchronous (real-time) audio-video encounter.  The patient (or guardian if applicable) is aware that this is a billable service, which includes applicable co-pays. This Virtual Visit was conducted with patient's (and/or legal guardian's) consent. The visit was conducted pursuant to the emergency declaration under the [facility] [address] authority and the [employer] Act. Patient identification was verified, and a caregiver was present when appropriate. The patient was located at Home: [address] [place] [address]. Provider was located at [facility] [phone] [employer], [address] [place] [address],  [address].         --Gentry Cruz MD

## 2022-11-15 ENCOUNTER — PATIENT MESSAGE (OUTPATIENT)
Dept: INTERNAL MEDICINE CLINIC | Age: 57
End: 2022-11-15

## 2022-11-15 DIAGNOSIS — L91.8 SKIN TAG: Primary | ICD-10-CM

## 2022-11-15 DIAGNOSIS — L57.0 MULTIPLE ACTINIC KERATOSES: ICD-10-CM

## 2023-03-01 ENCOUNTER — OFFICE VISIT (OUTPATIENT)
Dept: DERMATOLOGY | Age: 58
End: 2023-03-01
Payer: COMMERCIAL

## 2023-03-01 DIAGNOSIS — D22.70 MULTIPLE BENIGN MELANOCYTIC NEVI OF UPPER EXTREMITY, LOWER EXTREMITY, AND TRUNK: Primary | ICD-10-CM

## 2023-03-01 DIAGNOSIS — L82.1 OTHER SEBORRHEIC KERATOSIS: ICD-10-CM

## 2023-03-01 DIAGNOSIS — L81.4 LENTIGINES: ICD-10-CM

## 2023-03-01 DIAGNOSIS — D22.5 MULTIPLE BENIGN MELANOCYTIC NEVI OF UPPER EXTREMITY, LOWER EXTREMITY, AND TRUNK: Primary | ICD-10-CM

## 2023-03-01 DIAGNOSIS — D22.60 MULTIPLE BENIGN MELANOCYTIC NEVI OF UPPER EXTREMITY, LOWER EXTREMITY, AND TRUNK: Primary | ICD-10-CM

## 2023-03-01 DIAGNOSIS — L21.8 OTHER SEBORRHEIC DERMATITIS: ICD-10-CM

## 2023-03-01 DIAGNOSIS — L72.0 MILIA: ICD-10-CM

## 2023-03-01 PROCEDURE — 99214 OFFICE O/P EST MOD 30 MIN: CPT | Performed by: INTERNAL MEDICINE

## 2023-03-01 RX ORDER — CLOBETASOL PROPIONATE 0.46 MG/ML
SOLUTION TOPICAL
Qty: 50 ML | Refills: 3 | Status: SHIPPED | OUTPATIENT
Start: 2023-03-01

## 2023-03-01 RX ORDER — KETOCONAZOLE 20 MG/ML
SHAMPOO TOPICAL
Qty: 120 ML | Refills: 2 | Status: SHIPPED | OUTPATIENT
Start: 2023-03-01

## 2023-03-01 NOTE — PATIENT INSTRUCTIONS
Thank you for visiting [facility] today! Please follow the instructions below as we discussed in clinic:      SUNSCREENS: UVA and UVB PROTECTION    Sunlight consists of two types of light that can cause or worsen most skin problems:    UVA (ultraviolet A)  UVB (ultraviolet B)   Causes brown spots/sun spots Causes skin cancer   Causes wrinkles Causes sunburn   Causes aging Responsible for tanning    Worsens rosacea Strongest in summer    Less variation with seasons Peak hours 10am-2pm   All year round  SPF rates UVB protection    All day strong    No rating system available     Passes through glass and clouds      *Sunscreens that block both UVA and UVB light contain:  Zinc Oxide (should contain at least 5% zinc oxide)  Titanium Dioxide  Parsol or Avobenzone (additives improve stability of Avobenzone)  There are other UVA blocking ingredients but they are not as complete as these three. Tips/Suggestions  1) Always use sunscreens with SPF of 30 or higher  2) Make sure the sunscreen has zinc oxide or other UVA block such as Helioplex or Anthelios in product  3) Remember there is no \"safe UV light:. ..so there is no such thing as a safe suntan. 4) Apply sunscreen daily (even in winter and on cloudy days) and reapply every 2 to 4 hours depending on activity. 5) Approximately one ounce (a shot glass) is necessary to adequately cover the entire body. 6) Approximately one teaspoon is necessary to adequately cover the face    TINTED SUNSCREENS  - La Roche Posay. Anthelios mineral tinted sunscreen. SPF. 50  [address]vene. [place] UV Mineral Multi-Defense Tinted Sunscreen SPF 50+   Avene. Mineral Tinted Compact SPF 48. Tizo 3 facial primer tinted SPF 40  Eltamd Uv Glow Tinted Broad-Spectrum Spf 36  Eltamd Uv Restore Tinted Broad-Spectrum Spf 40   Eltamd Uv Physical Broad-Spectrum Spf 41   Eltamd Uv Elements Broad-Spectrum Spf 44   Dermablend. Cover Creme.  High-performance cream foundation for maximum coverage SPF 30 Vichy. [employer] Sheila Tinted 100% Mineral Sunscreen SPF 60%. Vichy. [address] [employer]. [address]. Isdin. Eryfotona Ageless. Ultralight tinted mineral sunscreen. Isdin. Isdinceutics Mineral Brush. Supergoop Mineral CC cream (Comes in several shades, friendly for pigmented skin)  CoTz Flawless Complexion SPF 50 tinted  CoTz Face Prime and Protect SPF 40 tinted    CHEMICAL SUNSCREEN  - La Roche-Posay Anthelios Melt-in Milk Body Face Sunscreen Lotion Broad Spectrum SPF 61 or 100   - La Roche-Posay ANTHELIOS COOLING WATER SUNSCREEN LOTION SPF 60  - La Roche-Posay ANTHELIOS LOTION SPRAY SUNSCREEN SPF 60  - La Roche-Posay ANTHELIOS ACTIVEWEAR SPORT SUNSCREEN LOTION SPF 60    MOISTURIZER WITH CHEMICAL SUNSCREEN NON-COMEDOGENIC  - CeraVe Facial Moisturizing Lotion AM with Sunscreen, Broad Spectrum SPF 30: Ceramides/niacinamide/HA. - CeraVe Ultra-Light Moisturizing Lotion with Sunscreen, Broad Spectrum SPF 30. Ceramides/HA   - La Roche-Posay Toleriane Double Repair Moisturizer SPF 27. Ceramine/Niacinamide. Very dry skin  - Eucerin Daily Protection. Moisturizing Face Lotion Sunscreen SPF 30  - Cetaphil® PRO Dermacontrol. Oil Absorbing Moisturizer SPF 30: Very oily skin  - Cetaphil® Daily Facial Moisturizer SPF 48: Dry skin  - Neutrogena Visibly Even Daily Moisturizer with Sunscreen, Broad Spectrum SPF [facility] Water Gel Sunscreen, Broad Spectrum SPF 25  - Aveeno Positively Radiant Daily Moisturizer, Broad Spectrum SPF 30    MOISTURIZER WITH PHYSICAL SUNSCREEN. NON-COMEDOGENIC  - Neutrogena. Ultra-Calming Daily Levon Cash MD UV Physical Broad Spectrum SPF 39. Water resistant.  - La Roche-Posay Anthelios SPF 50 Ultra Light Mineral Sunscreen Fluid. Water resistant  - La Roche-Posay Anthelios 100% Mineral Sunscreen Moisturizer with Hyaluronic Acid  - COOLA Mineral Face SPF 30 Matte Tint Moisturizer.  Water resistant  - Avene SPF 50+ Mineral Light Hydrating Sunscreen Lotion. Water resistant. - CeraVe Skin Renewing Day Cream Broad Spectrum SPF 27  - Aveeno Ultra-Calming Daily Moisturizer Broad Spectrum SPF 30    The following are some examples of vendors of sun protective clothing:  - Coolibar (www.coolibar. [employer])  - Sun Precautions (www.sunprecautions. com)  - Sunday Afternoons (www.sundayaCelsion. [employer])  Earmon Hy (www.wallaroBuzzwire. [employer])  - Sonya life (www.cabanalife. [employer])    Remember the best sunscreen is whichever one you will wear every day!

## 2023-03-01 NOTE — PROGRESS NOTES
[facility]  Elvia Aguilar MD  [phone]    Date of Visit: 3/1/2023  [birth year]21 Katelynn Briseno is a 61 y.o. female who presents for skin lesions. Chief Complaint:   Chief Complaint   Patient presents with    Skin Exam     NP- skin exam- former Dr. Lissette davidson. History of Present Illness:    Patient denies any other new or changing skin lesions. They would like all of their skin lesions to be evaluated for skin cancer. Derm hx:  -Seb derm treated with keto shampoo + clobetasol soln    Personal history of non melanoma skin cancer: No   Personal history of melanoma: No  Personal history of abnormal/dysplastic moles: No  Personal history of tanning bed use current: No  Personal history of tanning bed use: No  Personal history of blistering sunburns: Yes  Personal history of extensive sun exposure: Yes  Burns easily: Yes  Practicing sun protective habits:  Yes using sunscreen SPF  48      *Personal history of skin cancer: AK s/p cryo  *Family history of skin cancer: None     Review of Systems:  Gen: Feels well, good sense of health. Skin: No new or changing moles, no history of keloids or hypertrophic scars. Past Medical History, Family History, Surgical History, Medications and Allergies reviewed. Past Medical History:   Diagnosis Date    Breast mass 1995    Right breast    COVID-19     Depression     Menopause ovarian failure     Vertigo      Past Surgical History:   Procedure Laterality Date    BREAST BIOPSY      BREAST LUMPECTOMY  1996    benign       Allergies   Allergen Reactions    Doxycycline Rash     Outpatient Medications Marked as Taking for the 3/1/23 encounter (Office Visit) with Lila Vitale MD   Medication Sig Dispense Refill    clobetasol (TEMOVATE) 0.05 % external solution Apply to affected areas on scalp twice daily x 1-2 weeks, then as needed with flares 50 mL 3    ketoconazole (NIZORAL) 2 % shampoo Use to wash the scalp 3 times weekly.   Leave on the scalp for 5 minutes prior to rinsing. 120 mL 2    olmesartan (BENICAR) 20 MG tablet Take 1 tablet by mouth daily 90 tablet 1         Physical Examination   No acute distress. Mood clear/affect appropriate. Alert and oriented. Mucous membranes moist.  Sclera anicteric. Full body skin exam was conducted to include the scalp, face, lips, lids/conjunctiva, ears, neck, chest, abdomen, back, buttock, right and left hands and forearms, right and left leg and feet and was normal with the following exceptions:   - On trunk and bilateral upper and lower extremities, there are multiple well-circumscribed, homogenous tan to brown macules and papules   - Multiple tan to light brown macules on face, shoulders and arms in a photo-distributed pattern  - Scattered bright erythematous 2-3mm papules on trunk and bilateral extremities  -Scattered, brown, stuck-on appearing, verrucous papules on trunk and extremities  -White papules around eyelids   -Pink scaly plaque on occipital scalp    Assessment and Plan     1. Multiple benign melanocytic nevi of upper extremity, lower extremity, and trunk  - Benign, reassurance  - Counseled on importance of daily sun protection (Broad spectrum, SPF >30), monthly self skin exams to monitor for any moles/skin lesions that change in size, shape, color, itch or bleed  - Reviewed ABCDEs of melanoma  - Sun screen hand out provided  - Patient should return to clinic sooner if having any new or changing lesions       2. Lentigines  -Benign, reassurance   - Reviewed relationship with sun exposure. Reviewed lentigines are consistent with chronic actinic damage which increases risk of development of future skin cancers. - Rec daily sunscreen with SPF 30 + broad spectrum, monthly self-skin checks      3. Other seborrheic keratosis  -Benign, reassurance       4. Other seborrheic dermatitis,scalp--not controlled  - Discussed diagnosis, typical course, and treatment options.  Reviewed chronic nature of condition and need for maintenance trmt  - Start ketoconazole 2% shampoo TIW to scalp--dec to weekly when clear   - Cont clobetasol 0.05% solution to scalp BID PRN    5. Milia  -Benign, reassurance      RTC 1 yr for FBSE; sooner for scalp if not improved   Return to clinic sooner for any new or changing lesions    Note is transcribed using voice recognition software. Inadvertent computerized transcription errors may be present.     Tobin Browne MD

## 2023-03-06 ENCOUNTER — TELEPHONE (OUTPATIENT)
Dept: INTERNAL MEDICINE CLINIC | Age: 58
End: 2023-03-06

## 2023-03-22 ENCOUNTER — HOSPITAL ENCOUNTER (OUTPATIENT)
Dept: MAMMOGRAPHY | Age: 58
Discharge: HOME | End: 2023-03-22
Payer: COMMERCIAL

## 2023-03-22 VITALS — BODY MASS INDEX: 31.39 KG/M2 | WEIGHT: 200 LBS | HEIGHT: 67 IN

## 2023-03-22 DIAGNOSIS — Z12.31 VISIT FOR SCREENING MAMMOGRAM: ICD-10-CM

## 2023-03-22 PROCEDURE — 77067 SCR MAMMO BI INCL CAD: CPT

## 2023-03-27 ENCOUNTER — PROCEDURE VISIT (OUTPATIENT)
Dept: DERMATOLOGY | Age: 58
End: 2023-03-27

## 2023-03-27 DIAGNOSIS — L91.8 ACROCHORDON: Primary | ICD-10-CM

## 2023-03-27 PROCEDURE — MISCTAG14 COSMETIC REMOVAL OF BENIGN TAGS UP TO 14: Performed by: INTERNAL MEDICINE

## 2023-03-27 NOTE — PATIENT INSTRUCTIONS
Thank you for visiting [facility] today! Please follow the instructions below as we discussed in clinic:    Wound Care Instructions  Start cleansing the wound with mild soapy water 1-2 times per day. (Can use regular body wash or face wash)  Gently dry the area. Apply Aquaphor or Vaseline (petroleum jelly) to the wound using a clean cotton tipped applicator twice a day. Cover with a clean bandage if still having any bleeding. Repeat this process until the biopsy site is healed (can take 1-3 weeks)  You may shower and bathe as usual.   For pain, you may take acetaminophen/tylenol (extra or regular strength) the first 24 hours. Do not go over the recommended limit on the bottle.

## 2023-03-27 NOTE — PROGRESS NOTES
Cosmetic Skin Tag Removal Visit    Subjective:      Sandhya Li is a 61 y.o. female who presents for Cosmetic skin tag removal. Patient was informed that this procedure is not covered by insurance and they paid out of pocket for this visit. Reviewed risks/benefits of skin tag removal including scarring, bleeding, pain, dyspigmentation, infection, blistering (with cryotherapy). After discussion of these risks, patients desired to proceed. Reviewed that spots may still be pink appearing in 4-6 weeks with wedding coming up. Pt okay with this except she doesn't want to treat ones on her face today      Objective:     Skin:  5 skin tags on neck and R abdomen were removed using scissors and forceps after alcohol prep; hemostasis is obtained with aluminum chloride and discarded. 2 Sessile papules on L abdomen were treated with cryotherapy. Reviewed risks of cryotherapy including blistering, pain, erythema, dyspigmentation, infection and patient agreed to proceed. Consent was obtained. 1 cycles of liquid nitrogen applied to each lesion for 5 seconds using a Cry-Ac cryo spray gun. 7 total    Plan:       1. The patient is instructed to watch for signs of infection including erythema, pain,       purulent discharge, or crusting. 2. Written patient instruction given. Patient has daughter's wedding in 6 weeks and defers treating Sks on face and remaining SK on R inframammary region until after that.  Reviewed that since we did not make the 14 count, I will include the removal of the others under $85 she paid today in her case     Ynes Parson MD

## 2023-03-29 DIAGNOSIS — I10 ESSENTIAL HYPERTENSION: ICD-10-CM

## 2023-03-29 LAB
ANION GAP SERPL CALCULATED.3IONS-SCNC: 13 MMOL/L (ref 3–16)
BUN SERPL-MCNC: 15 MG/DL (ref 7–20)
CALCIUM SERPL-MCNC: 10.1 MG/DL (ref 8.3–10.6)
CHLORIDE SERPL-SCNC: 104 MMOL/L (ref 99–110)
CO2 SERPL-SCNC: 25 MMOL/L (ref 21–32)
CREAT SERPL-MCNC: 0.7 MG/DL (ref 0.6–1.1)
GFR SERPLBLD CREATININE-BSD FMLA CKD-EPI: >60 ML/MIN/{1.73_M2}
GLUCOSE SERPL-MCNC: 90 MG/DL (ref 70–99)
POTASSIUM SERPL-SCNC: 4.8 MMOL/L (ref 3.5–5.1)
SODIUM SERPL-SCNC: 142 MMOL/L (ref 136–145)

## 2023-04-14 ENCOUNTER — OFFICE VISIT (OUTPATIENT)
Dept: INTERNAL MEDICINE CLINIC | Age: 58
End: 2023-04-14
Payer: COMMERCIAL

## 2023-04-14 VITALS
BODY MASS INDEX: 33.27 KG/M2 | WEIGHT: 212 LBS | HEIGHT: 67 IN | SYSTOLIC BLOOD PRESSURE: 160 MMHG | DIASTOLIC BLOOD PRESSURE: 84 MMHG

## 2023-04-14 DIAGNOSIS — R42 VERTIGO: ICD-10-CM

## 2023-04-14 DIAGNOSIS — G47.00 INSOMNIA, UNSPECIFIED TYPE: ICD-10-CM

## 2023-04-14 DIAGNOSIS — Z12.4 SCREENING FOR CERVICAL CANCER: ICD-10-CM

## 2023-04-14 DIAGNOSIS — E66.09 CLASS 1 OBESITY DUE TO EXCESS CALORIES WITHOUT SERIOUS COMORBIDITY WITH BODY MASS INDEX (BMI) OF 33.0 TO 33.9 IN ADULT: ICD-10-CM

## 2023-04-14 DIAGNOSIS — Z12.11 ENCOUNTER FOR SCREENING FOR MALIGNANT NEOPLASM OF COLON: ICD-10-CM

## 2023-04-14 DIAGNOSIS — I10 ESSENTIAL HYPERTENSION: ICD-10-CM

## 2023-04-14 DIAGNOSIS — Z00.00 WELL ADULT EXAM: Primary | ICD-10-CM

## 2023-04-14 PROCEDURE — 99396 PREV VISIT EST AGE 40-64: CPT | Performed by: INTERNAL MEDICINE

## 2023-04-14 PROCEDURE — 3078F DIAST BP <80 MM HG: CPT | Performed by: INTERNAL MEDICINE

## 2023-04-14 PROCEDURE — 3074F SYST BP LT 130 MM HG: CPT | Performed by: INTERNAL MEDICINE

## 2023-04-14 RX ORDER — OLMESARTAN MEDOXOMIL 40 MG/1
40 TABLET ORAL DAILY
Qty: 90 TABLET | Refills: 1 | Status: SHIPPED | OUTPATIENT
Start: 2023-04-14

## 2023-04-14 RX ORDER — TRAZODONE HYDROCHLORIDE 50 MG/1
TABLET ORAL
Qty: 60 TABLET | Refills: 2 | Status: SHIPPED | OUTPATIENT
Start: 2023-04-14

## 2023-04-14 SDOH — ECONOMIC STABILITY: FOOD INSECURITY: WITHIN THE PAST 12 MONTHS, THE FOOD YOU BOUGHT JUST DIDN'T LAST AND YOU DIDN'T HAVE MONEY TO GET MORE.: NEVER TRUE

## 2023-04-14 SDOH — ECONOMIC STABILITY: FOOD INSECURITY: WITHIN THE PAST 12 MONTHS, YOU WORRIED THAT YOUR FOOD WOULD RUN OUT BEFORE YOU GOT MONEY TO BUY MORE.: NEVER TRUE

## 2023-04-14 SDOH — ECONOMIC STABILITY: HOUSING INSECURITY
IN THE LAST 12 MONTHS, WAS THERE A TIME WHEN YOU DID NOT HAVE A STEADY PLACE TO SLEEP OR SLEPT IN A SHELTER (INCLUDING NOW)?: NO

## 2023-04-14 SDOH — ECONOMIC STABILITY: INCOME INSECURITY: HOW HARD IS IT FOR YOU TO PAY FOR THE VERY BASICS LIKE FOOD, HOUSING, MEDICAL CARE, AND HEATING?: NOT HARD AT ALL

## 2023-04-14 ASSESSMENT — PATIENT HEALTH QUESTIONNAIRE - PHQ9
SUM OF ALL RESPONSES TO PHQ QUESTIONS 1-9: 0
SUM OF ALL RESPONSES TO PHQ9 QUESTIONS 1 & 2: 0
8. MOVING OR SPEAKING SO SLOWLY THAT OTHER PEOPLE COULD HAVE NOTICED. OR THE OPPOSITE, BEING SO FIGETY OR RESTLESS THAT YOU HAVE BEEN MOVING AROUND A LOT MORE THAN USUAL: 0
7. TROUBLE CONCENTRATING ON THINGS, SUCH AS READING THE NEWSPAPER OR WATCHING TELEVISION: 0
3. TROUBLE FALLING OR STAYING ASLEEP: 0
9. THOUGHTS THAT YOU WOULD BE BETTER OFF DEAD, OR OF HURTING YOURSELF: 0
SUM OF ALL RESPONSES TO PHQ QUESTIONS 1-9: 0
6. FEELING BAD ABOUT YOURSELF - OR THAT YOU ARE A FAILURE OR HAVE LET YOURSELF OR YOUR FAMILY DOWN: 0
5. POOR APPETITE OR OVEREATING: 0
10. IF YOU CHECKED OFF ANY PROBLEMS, HOW DIFFICULT HAVE THESE PROBLEMS MADE IT FOR YOU TO DO YOUR WORK, TAKE CARE OF THINGS AT HOME, OR GET ALONG WITH OTHER PEOPLE: 0
4. FEELING TIRED OR HAVING LITTLE ENERGY: 0
1. LITTLE INTEREST OR PLEASURE IN DOING THINGS: 0
2. FEELING DOWN, DEPRESSED OR HOPELESS: 0

## 2023-04-17 PROBLEM — G47.00 INSOMNIA: Status: ACTIVE | Noted: 2023-04-17

## 2023-04-17 PROBLEM — E66.09 CLASS 1 OBESITY DUE TO EXCESS CALORIES WITHOUT SERIOUS COMORBIDITY WITH BODY MASS INDEX (BMI) OF 33.0 TO 33.9 IN ADULT: Status: ACTIVE | Noted: 2023-04-17

## 2023-04-17 ASSESSMENT — ENCOUNTER SYMPTOMS
CONSTIPATION: 0
DIARRHEA: 0
SINUS PAIN: 0
COUGH: 0
BACK PAIN: 0
SHORTNESS OF BREATH: 0

## 2023-05-23 ENCOUNTER — OFFICE VISIT (OUTPATIENT)
Dept: ENT CLINIC | Age: 58
End: 2023-05-23
Payer: COMMERCIAL

## 2023-05-23 ENCOUNTER — PROCEDURE VISIT (OUTPATIENT)
Dept: AUDIOLOGY | Age: 58
End: 2023-05-23
Payer: COMMERCIAL

## 2023-05-23 VITALS
BODY MASS INDEX: 33.4 KG/M2 | DIASTOLIC BLOOD PRESSURE: 81 MMHG | HEART RATE: 98 BPM | TEMPERATURE: 97.8 F | HEIGHT: 67 IN | WEIGHT: 212.8 LBS | SYSTOLIC BLOOD PRESSURE: 112 MMHG

## 2023-05-23 DIAGNOSIS — H90.3 SENSORINEURAL HEARING LOSS, BILATERAL: Primary | ICD-10-CM

## 2023-05-23 DIAGNOSIS — H93.13 TINNITUS, BILATERAL: ICD-10-CM

## 2023-05-23 DIAGNOSIS — R42 DIZZINESS: Primary | ICD-10-CM

## 2023-05-23 DIAGNOSIS — R42 DIZZINESS AND GIDDINESS: ICD-10-CM

## 2023-05-23 PROCEDURE — 3079F DIAST BP 80-89 MM HG: CPT | Performed by: OTOLARYNGOLOGY

## 2023-05-23 PROCEDURE — 92557 COMPREHENSIVE HEARING TEST: CPT | Performed by: AUDIOLOGIST

## 2023-05-23 PROCEDURE — 3074F SYST BP LT 130 MM HG: CPT | Performed by: OTOLARYNGOLOGY

## 2023-05-23 PROCEDURE — 99204 OFFICE O/P NEW MOD 45 MIN: CPT | Performed by: OTOLARYNGOLOGY

## 2023-05-23 PROCEDURE — 92567 TYMPANOMETRY: CPT | Performed by: AUDIOLOGIST

## 2023-05-23 NOTE — PROGRESS NOTES
Eran Quesada   1965, 61 y.o. female   [id number]       Referring Provider: Dagoberto Pfeiffer MD  Referral Type: In an order in [address] [place] [address]    Reason for Visit: Evaluation of the cause of disorders of tinnitus  and balance. ADULT AUDIOLOGIC EVALUATION      Eran Quesada is a 61 y.o. female seen today, 5/23/2023 , for an initial audiologic evaluation. Patient was seen by Dagoberto Pfeiffer MD prior to today's evaluation. AUDIOLOGIC AND OTHER PERTINENT MEDICAL HISTORY:      Eran Quesada noted imbalance for the past ~ 4 years. She notes a possible decline in hearing and long-standing history of tinnitus, bilaterally with left ear perceived worse than right. Patient also reports family history of hearing loss in father. No additional significant otologic or medical history was reported. Eran Quesada denied otalgia, otorrhea, history of falls, history of occupational/recreational noise exposure, history of head trauma, and history of ear surgery. Date: 5/23/2023     IMPRESSIONS:      Today's results revealed a symmetric sensorineural hearing loss with excellent word recognition, bilaterally. Follow medical recommendations of Dagoberto Pfeiffer MD.     ASSESSMENT AND FINDINGS:     Otoscopy revealed: Clear ear canals bilaterally    RIGHT EAR:  Hearing Sensitivity: Within normal limits through 2kHz sloping to a mild to moderate sensorineural hearing loss. Speech Recognition Threshold: 5 dB HL  Word Recognition: Excellent 96%, based on NU-6 25-word list at 50 dBHL using recorded speech stimuli. Tympanometry: Normal peak pressure and compliance, Type A tympanogram, consistent with normal middle ear function. LEFT EAR:  Hearing Sensitivity: Within normal limits through 2kHz sloping to a mild to moderate sensorineural hearing loss. Speech Recognition Threshold: 10 dB HL  Word Recognition: Excellent 100%, based on NU-6 25-word list at 50 dBHL using recorded speech stimuli.     Tympanometry: Normal peak

## 2023-05-23 NOTE — PROGRESS NOTES
CHIEF COMPLAINT: Dizziness    HISTORY OF PRESENT ILLNESS:  61 y.o. female who presents with dizziness of 4 years duration. It feels like fogginess. Has a fullness feeling in her head. Present all the time. Started with true vertigo 4 years ago. Vertigo has resolved. Has tinnitus both ears, high pitch. Has fullness left ear and tinnitus is worse. Has auditory recruitment left ear. PAST MEDICAL HISTORY:   Social History     Tobacco Use   Smoking Status Never   Smokeless Tobacco Never                                                    Social History     Substance and Sexual Activity   Alcohol Use Yes    Alcohol/week: 14.0 standard drinks    Types: 14 Glasses of wine per week                                                    Current Outpatient Medications:     traZODone (DESYREL) 50 MG tablet, Take 1-2 tabs nightly as needed for sleep, Disp: 60 tablet, Rfl: 2    olmesartan (BENICAR) 40 MG tablet, Take 1 tablet by mouth daily, Disp: 90 tablet, Rfl: 1    clobetasol (TEMOVATE) 0.05 % external solution, Apply to affected areas on scalp twice daily x 1-2 weeks, then as needed with flares, Disp: 50 mL, Rfl: 3    ketoconazole (NIZORAL) 2 % shampoo, Use to wash the scalp 3 times weekly. Leave on the scalp for 5 minutes prior to rinsing., Disp: 120 mL, Rfl: 2                                                 Past Medical History:   Diagnosis Date    Breast mass 1995    Right breast    COVID-19     Depression     Hypertension     Menopause ovarian failure     Rash     Tinnitus     Vertigo                                                     Past Surgical History:   Procedure Laterality Date    BREAST BIOPSY      BREAST LUMPECTOMY  1996    benign     FAMILY HISTORY: Family history reviewed. Except as noted in history of present illness, there is no pertinent family history      REVIEW OF SYSTEMS:  All pertinent positive and negative review of systems included in HPI.   Otherwise, all systems are reviewed and

## 2023-06-05 ENCOUNTER — TELEPHONE (OUTPATIENT)
Dept: ENT CLINIC | Age: 58
End: 2023-06-05

## 2023-06-05 NOTE — TELEPHONE ENCOUNTER
Patient called and would like to know the results of her MRI was done at [facility] on [facility]nd    Images have been pushed through pacs

## 2023-06-16 ENCOUNTER — PATIENT MESSAGE (OUTPATIENT)
Dept: INTERNAL MEDICINE CLINIC | Age: 58
End: 2023-06-16

## 2023-06-19 NOTE — TELEPHONE ENCOUNTER
I don't see a script for Wegovy; will need one in order to do the PA. Please respond to the pool ( [facility] [address] [facility]). Thank you!

## 2023-06-20 ENCOUNTER — TELEPHONE (OUTPATIENT)
Dept: ORTHOPEDIC SURGERY | Age: 58
End: 2023-06-20

## 2023-06-20 NOTE — TELEPHONE ENCOUNTER
Submitted PA for Wegovy 0.25MG/0.5ML auto-injectors  Via CMM Key: [id number] STATUS: PENDING. Follow up done daily; if no response in three days we will refax for status check. If another three days goes by with no response we will call the insurance for status.

## 2023-06-21 NOTE — TELEPHONE ENCOUNTER
Received DENIAL for Wegovy 0.25MG/0.5ML auto-injectors - medications for weight loss are an EXCLUDED benefit and are not covered for any reason. Denial letter attached. If this requires a response please respond to the pool ( [facility] [address]). Thank you please advise patient.

## 2023-07-19 ENCOUNTER — TELEMEDICINE (OUTPATIENT)
Dept: INTERNAL MEDICINE CLINIC | Age: 58
End: 2023-07-19
Payer: COMMERCIAL

## 2023-07-19 DIAGNOSIS — E66.09 CLASS 1 OBESITY DUE TO EXCESS CALORIES WITHOUT SERIOUS COMORBIDITY WITH BODY MASS INDEX (BMI) OF 33.0 TO 33.9 IN ADULT: Primary | ICD-10-CM

## 2023-07-19 PROCEDURE — 99213 OFFICE O/P EST LOW 20 MIN: CPT | Performed by: INTERNAL MEDICINE

## 2023-07-19 NOTE — PROGRESS NOTES
Jose Morgan (:  1965) is a Established patient, presenting virtually for evaluation of the following:    Assessment & Plan   Below is the assessment and plan developed based on review of pertinent history, physical exam, labs, studies, and medications. 1. Class 1 obesity due to excess calories without serious comorbidity with body mass index (BMI) of 33.0 to 33.9 in adult  -Discussed various medication options. From a cost standpoint, phentermine is likely the best, though it is a stimulant so comes with some possible side effects and only has limited time use. She is interested in starting this. We will obtain an EKG in the office, then we will send the medication to the pharmacy as long as that looks normal.  Follow-up in a month. Return in about 1 month (around 2023) for Weight check. Subjective   HPI    She was trying to get a prescription for [facility] but the insurance would not cover it. She is wondering about other options. She had looked into other online sources of semaglutide but is not sure how reliable that is. She has worked on diet with little improvement in weight. Her blood pressure has been controlled on the current medication. She occasionally has very brief palpitations but otherwise no heart issues. Review of Systems       Objective   Patient-Reported Vitals  No data recorded     Physical Exam  Vitals reviewed. Constitutional:       General: She is not in acute distress. Appearance: Normal appearance. HENT:      Head: Normocephalic and atraumatic. Pulmonary:      Effort: Pulmonary effort is normal. No respiratory distress. Neurological:      General: No focal deficit present. Mental Status: She is alert. Psychiatric:         Mood and Affect: Mood normal.         Behavior: Behavior normal.         Thought Content:  Thought content normal.         Judgment: Judgment normal.                Jose Morgan, was evaluated through a synchronous

## 2023-07-26 ENCOUNTER — NURSE ONLY (OUTPATIENT)
Dept: INTERNAL MEDICINE CLINIC | Age: 58
End: 2023-07-26
Payer: COMMERCIAL

## 2023-07-26 VITALS — WEIGHT: 213.2 LBS | BODY MASS INDEX: 33.39 KG/M2

## 2023-07-26 DIAGNOSIS — E66.09 CLASS 1 OBESITY DUE TO EXCESS CALORIES WITHOUT SERIOUS COMORBIDITY WITH BODY MASS INDEX (BMI) OF 33.0 TO 33.9 IN ADULT: Primary | ICD-10-CM

## 2023-07-26 DIAGNOSIS — R63.4 WEIGHT LOSS: Primary | ICD-10-CM

## 2023-07-26 PROCEDURE — 93000 ELECTROCARDIOGRAM COMPLETE: CPT | Performed by: INTERNAL MEDICINE

## 2023-07-26 RX ORDER — PHENTERMINE HYDROCHLORIDE 15 MG/1
15 CAPSULE ORAL EVERY MORNING
Qty: 30 CAPSULE | Refills: 0 | Status: SHIPPED | OUTPATIENT
Start: 2023-07-26 | End: 2023-08-25

## 2023-08-01 ENCOUNTER — PATIENT MESSAGE (OUTPATIENT)
Dept: INTERNAL MEDICINE CLINIC | Age: 58
End: 2023-08-01

## 2023-08-01 ENCOUNTER — TELEPHONE (OUTPATIENT)
Dept: INTERNAL MEDICINE CLINIC | Age: 58
End: 2023-08-01

## 2023-08-01 DIAGNOSIS — Z12.11 SCREENING FOR COLON CANCER: Primary | ICD-10-CM

## 2023-08-01 NOTE — TELEPHONE ENCOUNTER
Found colonoscopy it was done 11/18/18 recall date of 5 years.  Pt is going to see if Dr. Konrad Essex or Dr. Margy Appiah are in network and let us know which one to place a referral.

## 2023-08-01 NOTE — TELEPHONE ENCOUNTER
Patient is asking if she is due for a colonscopy and if so, please ask Dr. Montrell Schmid to put an order in for it. Her insurance changes on Oct 1st and she would like to get it done before going on her new insurance.     Please call and advise

## 2023-08-01 NOTE — TELEPHONE ENCOUNTER
From: Danilo Mendoza  To: Dr. Kaylan Jovel: 8/1/2023 1:08 PM EDT  Subject: Coloscopy provider    Roberto Luna,  Please make the referral for Jameson Nguyen. Thank you!   Harini Moser

## 2023-08-12 ENCOUNTER — NURSE TRIAGE (OUTPATIENT)
Dept: OTHER | Facility: CLINIC | Age: 58
End: 2023-08-12

## 2023-08-12 NOTE — TELEPHONE ENCOUNTER
Location of patient: [place]    Subjective: Caller states he was walking about 2 miles when she began to feel lightheaded. Pt states she felt woozy. Pt stated at the moment of dizziness she felt faint. Pt is also taking Phentermine for weight loss. Pt states she did drink some water after walking for 50 minutes. Pulse 101. Pt states that this has not happened in that past. Pt states an episode of diarrhea. Pt states she does feel better but still feel slight lightheadedness. Pt was able to walk without difficulty. Current Symptoms: Dizziness    Onset: 1.5 hours  ago;     Associated Symptoms: NA    Pain Severity: 0/10; N/A; none    Temperature: Denies     What has been tried: Drinking water     LMP: NA Pregnant: NA    Recommended disposition: See PCP within 24 Hours/[facility]    Care advice provided, patient verbalizes understanding; denies any other questions or concerns; instructed to call back for any new or worsening symptoms. Pt advised not to walk in heat especially without drinking water. Pt advised to follow with PCP regarding weight loss medication    Patient/caller agrees to proceed to nearest [facility]     Attention Provider: Thank you for allowing me to participate in the care of your patient. The patient was connected to triage in response to symptoms provided. Please do not respond through this encounter as the response is not directed to a shared pool.         Reason for Disposition   Taking a medicine that could cause dizziness (e.g., blood pressure medications, diuretics)    Protocols used: Dizziness - Lightheadedness-ADULT-AH

## 2023-08-21 ENCOUNTER — OFFICE VISIT (OUTPATIENT)
Dept: INTERNAL MEDICINE CLINIC | Age: 58
End: 2023-08-21
Payer: COMMERCIAL

## 2023-08-21 VITALS
DIASTOLIC BLOOD PRESSURE: 70 MMHG | WEIGHT: 209 LBS | HEIGHT: 67 IN | SYSTOLIC BLOOD PRESSURE: 130 MMHG | BODY MASS INDEX: 32.8 KG/M2

## 2023-08-21 DIAGNOSIS — E66.09 CLASS 1 OBESITY DUE TO EXCESS CALORIES WITHOUT SERIOUS COMORBIDITY WITH BODY MASS INDEX (BMI) OF 32.0 TO 32.9 IN ADULT: Primary | ICD-10-CM

## 2023-08-21 PROCEDURE — 99213 OFFICE O/P EST LOW 20 MIN: CPT | Performed by: INTERNAL MEDICINE

## 2023-08-21 PROCEDURE — 3074F SYST BP LT 130 MM HG: CPT | Performed by: INTERNAL MEDICINE

## 2023-08-21 PROCEDURE — 3078F DIAST BP <80 MM HG: CPT | Performed by: INTERNAL MEDICINE

## 2023-08-21 RX ORDER — PHENTERMINE HYDROCHLORIDE 15 MG/1
15 CAPSULE ORAL EVERY MORNING
Qty: 30 CAPSULE | Refills: 0 | Status: SHIPPED | OUTPATIENT
Start: 2023-08-21 | End: 2023-09-20

## 2023-08-21 NOTE — PROGRESS NOTES
Scotty Souza (:  1965) is a 61 y.o. female,Established patient, here for evaluation of the following chief complaint(s):  Weight Management         ASSESSMENT/PLAN:  1. Class 1 obesity due to excess calories without serious comorbidity with body mass index (BMI) of 32.0 to 32.9 in adult  - given side effects will keep current dose for the next month, may increase for last month of treatment. OARRS reviewed, no concerns  -     phentermine 15 MG capsule; Take 1 capsule by mouth every morning for 30 days. Max Daily Amount: 15 mg, Disp-30 capsule, R-0Normal      Return in about 1 month (around 2023) for weight check. Subjective   SUBJECTIVE/OBJECTIVE:  HPI    She had one day when she was taking a walk and became extremely lightheaded, she was likely dehydrated. Took a couple days off the medication, has started back and tolerating well. Being more careful about hydration. No other side effects, denies palpitations, insomnia. Review of Systems       Objective   Physical Exam  Vitals reviewed. Constitutional:       General: She is not in acute distress. Appearance: Normal appearance. She is well-developed. HENT:      Head: Normocephalic and atraumatic. Cardiovascular:      Rate and Rhythm: Normal rate and regular rhythm. Heart sounds: Normal heart sounds. Pulmonary:      Effort: Pulmonary effort is normal. No respiratory distress. Breath sounds: Normal breath sounds. Skin:     General: Skin is warm and dry. Neurological:      Mental Status: She is alert. Psychiatric:         Behavior: Behavior normal.         Thought Content: Thought content normal.         Judgment: Judgment normal.                An electronic signature was used to authenticate this note.     --Donnie Pichardo MD

## 2023-09-21 ENCOUNTER — OFFICE VISIT (OUTPATIENT)
Dept: INTERNAL MEDICINE CLINIC | Age: 58
End: 2023-09-21
Payer: COMMERCIAL

## 2023-09-21 VITALS
DIASTOLIC BLOOD PRESSURE: 68 MMHG | SYSTOLIC BLOOD PRESSURE: 124 MMHG | HEIGHT: 67 IN | WEIGHT: 204 LBS | BODY MASS INDEX: 32.02 KG/M2

## 2023-09-21 DIAGNOSIS — E66.09 CLASS 1 OBESITY DUE TO EXCESS CALORIES WITHOUT SERIOUS COMORBIDITY WITH BODY MASS INDEX (BMI) OF 31.0 TO 31.9 IN ADULT: Primary | ICD-10-CM

## 2023-09-21 PROCEDURE — 99213 OFFICE O/P EST LOW 20 MIN: CPT | Performed by: INTERNAL MEDICINE

## 2023-09-21 PROCEDURE — 3074F SYST BP LT 130 MM HG: CPT | Performed by: INTERNAL MEDICINE

## 2023-09-21 PROCEDURE — 3078F DIAST BP <80 MM HG: CPT | Performed by: INTERNAL MEDICINE

## 2023-09-21 RX ORDER — PHENTERMINE HYDROCHLORIDE 37.5 MG/1
37.5 CAPSULE ORAL EVERY MORNING
Qty: 30 CAPSULE | Refills: 0 | Status: SHIPPED | OUTPATIENT
Start: 2023-09-21 | End: 2023-10-21

## 2023-09-21 NOTE — PROGRESS NOTES
Mehreen Collins (:  1965) is a 61 y.o. female,Established patient, here for evaluation of the following chief complaint(s):  Weight Loss         ASSESSMENT/PLAN:  1. Class 1 obesity due to excess calories without serious comorbidity with body mass index (BMI) of 31.0 to 31.9 in adult  -Having slow but steady weight loss, weight is down close to 4%, anticipate she will achieve 5% weight loss by 3 months. Tolerating current dose, we will try increasing to the next dose. Doing well with diet and exercise. OARRS reviewed, no concerns  -     phentermine 37.5 MG capsule; Take 1 capsule by mouth every morning for 30 days. Max Daily Amount: 37.5 mg, Disp-30 capsule, R-0Normal      Return in about 1 month (around 10/21/2023) for weight/med check. Subjective   SUBJECTIVE/OBJECTIVE:  HPI    She is doing well with the current dose medication. No problems or side effects. She has had no palpitations or lightheadedness. She has increased her exercise to 3 miles of walking per day. Focused on healthy diet, though she has had a couple of trips which set her back. Goal weight 165    Review of Systems       Objective   Physical Exam  Vitals reviewed. Constitutional:       General: She is not in acute distress. Appearance: Normal appearance. She is well-developed. She is obese. HENT:      Head: Normocephalic and atraumatic. Cardiovascular:      Rate and Rhythm: Normal rate and regular rhythm. Heart sounds: Normal heart sounds. Pulmonary:      Effort: Pulmonary effort is normal. No respiratory distress. Breath sounds: Normal breath sounds. Skin:     General: Skin is warm and dry. Neurological:      Mental Status: She is alert. Psychiatric:         Behavior: Behavior normal.         Thought Content: Thought content normal.         Judgment: Judgment normal.                  An electronic signature was used to authenticate this note.     --Burgess Eren MD

## 2023-10-22 ENCOUNTER — PATIENT MESSAGE (OUTPATIENT)
Dept: INTERNAL MEDICINE CLINIC | Age: 58
End: 2023-10-22

## 2023-10-22 DIAGNOSIS — H81.10 BENIGN PAROXYSMAL POSITIONAL VERTIGO, UNSPECIFIED LATERALITY: ICD-10-CM

## 2023-10-22 DIAGNOSIS — R42 VERTIGO: Primary | ICD-10-CM

## 2023-10-23 NOTE — TELEPHONE ENCOUNTER
From: Jose Louise  To: Dr. Moira Leonard: 10/22/2023 2:53 PM EDT  Subject: Referral for PT    Hortencia Gutierrez,  Is there a referral on file for me to see a physical therapist for vertigo/BPPV? If not, please ask Dr. Brian Henriquez if she would write a referral to the [facility]) PT department in the building next door to you? specifically, I would like to see [facility].     Thank you,  Sharon Peter

## 2023-10-26 ENCOUNTER — OFFICE VISIT (OUTPATIENT)
Dept: INTERNAL MEDICINE CLINIC | Age: 58
End: 2023-10-26
Payer: COMMERCIAL

## 2023-10-26 VITALS
BODY MASS INDEX: 30.76 KG/M2 | HEIGHT: 67 IN | SYSTOLIC BLOOD PRESSURE: 128 MMHG | DIASTOLIC BLOOD PRESSURE: 70 MMHG | WEIGHT: 196 LBS

## 2023-10-26 DIAGNOSIS — E66.09 CLASS 1 OBESITY DUE TO EXCESS CALORIES WITHOUT SERIOUS COMORBIDITY WITH BODY MASS INDEX (BMI) OF 30.0 TO 30.9 IN ADULT: Primary | ICD-10-CM

## 2023-10-26 PROCEDURE — 3078F DIAST BP <80 MM HG: CPT | Performed by: INTERNAL MEDICINE

## 2023-10-26 PROCEDURE — 99213 OFFICE O/P EST LOW 20 MIN: CPT | Performed by: INTERNAL MEDICINE

## 2023-10-26 PROCEDURE — 3074F SYST BP LT 130 MM HG: CPT | Performed by: INTERNAL MEDICINE

## 2023-10-26 RX ORDER — PHENTERMINE HYDROCHLORIDE 37.5 MG/1
37.5 CAPSULE ORAL EVERY MORNING
Qty: 30 CAPSULE | Refills: 0 | Status: SHIPPED | OUTPATIENT
Start: 2023-10-26 | End: 2023-11-25

## 2023-10-26 RX ORDER — PHENTERMINE HYDROCHLORIDE 37.5 MG/1
37.5 CAPSULE ORAL EVERY MORNING
Qty: 30 CAPSULE | Refills: 0 | Status: SHIPPED | OUTPATIENT
Start: 2023-11-25 | End: 2023-12-25

## 2023-10-26 NOTE — PROGRESS NOTES
Hayley Robledo (:  1965) is a 60 y.o. female,Established patient, here for evaluation of the following chief complaint(s):  Weight Management         ASSESSMENT/PLAN:  1. Class 1 obesity due to excess calories without serious comorbidity with body mass index (BMI) of 30.0 to 30.9 in adult  -She has been making significant progress. Tolerating medication at this dose, will continue. We will plan to follow-up again in 2 months then reassess, per changes to the [place] statutes this year she does not need to be seen monthly. We will plan to discontinue the medication once she acheives her target or plateaus, which ever comes first.  -     phentermine 37.5 MG capsule; Take 1 capsule by mouth every morning for 30 days. Max Daily Amount: 37.5 mg, Disp-30 capsule, R-0Normal  -     phentermine 37.5 MG capsule; Take 1 capsule by mouth every morning for 30 days. Max Daily Amount: 37.5 mg, Disp-30 capsule, R-0Normal      Return in about 2 months (around 2023) for weight management. Subjective   SUBJECTIVE/OBJECTIVE:  HPI    She has been doing well on this dose of phentermine. Has lost 17 lbs on the home scale. She has been careful with diet and exercising every day. She found that she has been vigilant about hydration and needs to make sure to eat something for breakfast, this helps to minimize side effects. Goal weight 165 pounds    Review of Systems       Objective   Physical Exam  Vitals reviewed. Constitutional:       General: She is not in acute distress. Appearance: Normal appearance. She is well-developed. HENT:      Head: Normocephalic and atraumatic. Cardiovascular:      Rate and Rhythm: Normal rate and regular rhythm. Heart sounds: Normal heart sounds. Pulmonary:      Effort: Pulmonary effort is normal. No respiratory distress. Breath sounds: Normal breath sounds. Skin:     General: Skin is warm and dry. Neurological:      Mental Status: She is alert.

## 2023-10-27 ENCOUNTER — NURSE ONLY (OUTPATIENT)
Dept: INTERNAL MEDICINE CLINIC | Age: 58
End: 2023-10-27
Payer: COMMERCIAL

## 2023-10-27 DIAGNOSIS — Z23 NEED FOR INFLUENZA VACCINATION: Primary | ICD-10-CM

## 2023-10-27 PROCEDURE — 90471 IMMUNIZATION ADMIN: CPT | Performed by: INTERNAL MEDICINE

## 2023-10-27 PROCEDURE — 90674 CCIIV4 VAC NO PRSV 0.5 ML IM: CPT | Performed by: INTERNAL MEDICINE

## 2023-11-01 RX ORDER — OLMESARTAN MEDOXOMIL 40 MG/1
40 TABLET ORAL DAILY
Qty: 90 TABLET | Refills: 1 | Status: SHIPPED | OUTPATIENT
Start: 2023-11-01

## 2023-11-14 ENCOUNTER — PATIENT MESSAGE (OUTPATIENT)
Dept: INTERNAL MEDICINE CLINIC | Age: 58
End: 2023-11-14

## 2023-11-15 ENCOUNTER — HOSPITAL ENCOUNTER (OUTPATIENT)
Dept: PHYSICAL THERAPY | Age: 58
Setting detail: THERAPIES SERIES
Discharge: HOME | End: 2023-11-15
Payer: COMMERCIAL

## 2023-11-15 DIAGNOSIS — H81.10 BPPV (BENIGN PAROXYSMAL POSITIONAL VERTIGO), UNSPECIFIED LATERALITY: Primary | ICD-10-CM

## 2023-11-15 DIAGNOSIS — H81.8X2 UNILATERAL VESTIBULAR WEAKNESS, LEFT: ICD-10-CM

## 2023-11-15 PROCEDURE — 97530 THERAPEUTIC ACTIVITIES: CPT

## 2023-11-15 PROCEDURE — 95992 CANALITH REPOSITIONING PROC: CPT

## 2023-11-15 PROCEDURE — 97163 PT EVAL HIGH COMPLEX 45 MIN: CPT

## 2023-11-15 NOTE — FLOWSHEET NOTE
Progressing: [] Met: [] Not Met: [] Adjusted  Pt will report 3 consecutive days with no dizziness to improve functional activities and return to PLOF. Status: [] Progressing: [] Met: [] Not Met: [] Adjusted    Overall Progression Towards Functional goals/ Treatment Progress Update:  [] Patient is progressing as expected towards functional goals listed. [] Progression is slowed due to complexities/Impairments listed. [] Progression has been slowed due to co-morbidities. [x] Plan just implemented, too soon (<30days) to assess goals progression   [] Goals require adjustment due to lack of progress  [] Patient is not progressing as expected and requires additional follow up with physician  [] Other:     CHARGE CAPTURE     PT CHARGE GRID   CPT Code (TIMED) minutes # CPT Code (UNTIMED) #     [] Therex (17626)     [x] EVAL:HIGH (51949 - Typically 45 minutes face-to-face) 1    [] Neuromusc. Re-ed (64824)    [] Re-Eval (20109)     [] Manual (01453)    [] Estim Unattended (07668)     [x] Ther. Act (00324) 23 2  [] Mech. Traction (92963)     [] Gait (55380)    [] Dry Needle 1-2 muscle (43390)     [] Aquatic Therex (68822)    [] Dry Needle 3+ muscle (25426)     [] Iontophoresis (18079)    [] VASO (89432)     [] Ultrasound (30948)    [] Group Therapy (71633)     [] Estim Attended (16006)    [x] Other: CRT x 1 1   Total Timed Code Tx Minutes 23 2  2     Total Treatment Minutes 40        Charge Justification:  (98809) THERAPEUTIC ACTIVITY- use of dynamic activities to improve functional performance. (Ex include squatting, ascending/descending stairs, walking, bending, lifting, catching, throwing, pushing, pulling, jumping.)  Direct, one on one contact, billed in 15-minute increments.   (54571) Canalith Repositioning Technique- a non-invasive treatment that involves the licensed PT moving patient's head or body into specific positions to relieve BPPV or dizziness with changes in head

## 2023-11-15 NOTE — TELEPHONE ENCOUNTER
From: Kathalene Schaumann  To: Dr. Rosario Organ: 11/14/2023 9:11 PM EST  Subject: Covid vaccine     Chad Jerryant,  Just wanted to report that Castro Epps and I both received the Covid vaccine on 11/1/23 -- Grabiel Marquez. Please add it to our records. We did not get a receipt and we forgot to take our vaccine record cards with us.     Thanks, Ericka Ann

## 2023-11-21 ENCOUNTER — HOSPITAL ENCOUNTER (OUTPATIENT)
Dept: PHYSICAL THERAPY | Age: 58
Setting detail: THERAPIES SERIES
Discharge: HOME | End: 2023-11-21
Payer: COMMERCIAL

## 2023-11-21 PROCEDURE — 97530 THERAPEUTIC ACTIVITIES: CPT

## 2023-11-21 PROCEDURE — 95992 CANALITH REPOSITIONING PROC: CPT

## 2023-11-21 NOTE — FLOWSHEET NOTE
Other: CRT x 1 1   Total Timed Code Tx Minutes 37 3  1     Total Treatment Minutes 60        Charge Justification:  (18618) THERAPEUTIC ACTIVITY- use of dynamic activities to improve functional performance. (Ex include squatting, ascending/descending stairs, walking, bending, lifting, catching, throwing, pushing, pulling, jumping.)  Direct, one on one contact, billed in 15-minute increments. (44854) Canalith Repositioning Technique- a non-invasive treatment that involves the licensed PT moving patient's head or body into specific positions to relieve BPPV or dizziness with changes in head position. Direct, one on one contact      TREATMENT PLAN   Plan: Cont POC- Continue emphasis/focus on exercise progression and dizziness. Next visit plan to reassess R posterior canal, once clear will reassess L posterior canal. Consider further vestibular rehab for residual symptoms. Electronically Signed by JONNY Argueta              Date: 11/21/2023     Note: If patient does not return for scheduled/recommended follow up visits, this note will serve as a discharge from care along with the most recent update on progress.

## 2023-11-30 ENCOUNTER — HOSPITAL ENCOUNTER (OUTPATIENT)
Dept: PHYSICAL THERAPY | Age: 58
Setting detail: THERAPIES SERIES
Discharge: HOME | End: 2023-11-30
Payer: COMMERCIAL

## 2023-11-30 DIAGNOSIS — E66.09 CLASS 1 OBESITY DUE TO EXCESS CALORIES WITHOUT SERIOUS COMORBIDITY WITH BODY MASS INDEX (BMI) OF 30.0 TO 30.9 IN ADULT: ICD-10-CM

## 2023-11-30 PROCEDURE — 95992 CANALITH REPOSITIONING PROC: CPT

## 2023-11-30 PROCEDURE — 97530 THERAPEUTIC ACTIVITIES: CPT

## 2023-11-30 PROCEDURE — 97112 NEUROMUSCULAR REEDUCATION: CPT

## 2023-11-30 RX ORDER — PHENTERMINE HYDROCHLORIDE 37.5 MG/1
37.5 CAPSULE ORAL EVERY MORNING
Qty: 30 CAPSULE | Refills: 0 | Status: SHIPPED | OUTPATIENT
Start: 2023-11-30 | End: 2023-12-30

## 2023-11-30 NOTE — FLOWSHEET NOTE
Date: 11/30/2023     Note: If patient does not return for scheduled/recommended follow up visits, this note will serve as a discharge from care along with the most recent update on progress.

## 2023-12-07 ENCOUNTER — HOSPITAL ENCOUNTER (OUTPATIENT)
Dept: PHYSICAL THERAPY | Age: 58
Setting detail: THERAPIES SERIES
Discharge: HOME | End: 2023-12-07
Payer: COMMERCIAL

## 2023-12-07 ENCOUNTER — HOSPITAL ENCOUNTER (OUTPATIENT)
Dept: PHYSICAL THERAPY | Age: 58
Setting detail: THERAPIES SERIES
End: 2023-12-07
Payer: COMMERCIAL

## 2023-12-07 PROCEDURE — 97112 NEUROMUSCULAR REEDUCATION: CPT

## 2023-12-07 PROCEDURE — 97530 THERAPEUTIC ACTIVITIES: CPT

## 2023-12-07 PROCEDURE — 95992 CANALITH REPOSITIONING PROC: CPT

## 2023-12-07 NOTE — FLOWSHEET NOTE
[address] [place] [address] and Therapy [address] [place] [address] office: [phone] fax: [phone]      Physical Therapy: TREATMENT/PROGRESS NOTE   Patient: Carlos Eduardo Jarvis (89 y.o. female)   Treatment Date: 2023   :  1965 MRN: [id number]   Visit #:   Insurance Allowable Auth Needed   12    Nov 15, 2023 to Dec 31, 2023 [x]Yes    []No    Insurance: Payor: Margy Burgess / Plan: Zen Gallagher ALFREDO / Product Type: *No Product type* /   Insurance ID: [id number] - (Commercial)  Secondary Insurance (if applicable):    Treatment Diagnosis:       ICD-10-CM     1. BPPV (benign paroxysmal positional vertigo), unspecified laterality  H81.10         2. Unilateral vestibular weakness, left  H81.8X2          Medical Diagnosis:    Vertigo [R42]  Benign paroxysmal positional vertigo, unspecified laterality [H81.10]   Referring Physician: Lul Fam MD  PCP: Lul Fam MD                             Plan of care signed (Y/N): Y    Date of Patient follow up with Physician:      Progress Report/POC: 11/15/23  POC update due: (10 visits /OR [address], whichever is less) 12/15/23    Precautions/ Contra-indications:   Latex allergy:   Yes  Pacemaker:     No  Other:                    N/A  Surgical Date:       N/A    Preferred Language for Healthcare:   [x]English       []other:    SUBJECTIVE EXAMINATION     Patient Report/Comments: Pt reports overall things are improving, but it is still there. Pt having no issues getting out of bed and walking to bathroom. Walking out in big spaces is still a problem, feels delayed. Standing still feels fine. Had an incident of losing balance when had to walk in the dark. Doesn't feel like having any more vertigo, but hasn't really tried to provoke it.    Forgot to do gaze x 1       Test used Initial score  11/15/23 2023   Dizziness Summary Dizziness  6/10 during subjective 2/10 upon arrival   Functional questionnaire [facility]

## 2023-12-08 ENCOUNTER — APPOINTMENT (OUTPATIENT)
Dept: PHYSICAL THERAPY | Age: 58
End: 2023-12-08
Payer: COMMERCIAL

## 2023-12-18 ENCOUNTER — APPOINTMENT (OUTPATIENT)
Dept: PHYSICAL THERAPY | Age: 58
End: 2023-12-18
Payer: COMMERCIAL

## 2023-12-20 ENCOUNTER — APPOINTMENT (OUTPATIENT)
Dept: PHYSICAL THERAPY | Age: 58
End: 2023-12-20
Payer: COMMERCIAL

## 2024-02-06 ENCOUNTER — PATIENT MESSAGE (OUTPATIENT)
Dept: INTERNAL MEDICINE CLINIC | Age: 59
End: 2024-02-06

## 2024-02-06 DIAGNOSIS — E66.09 CLASS 1 OBESITY DUE TO EXCESS CALORIES WITHOUT SERIOUS COMORBIDITY WITH BODY MASS INDEX (BMI) OF 30.0 TO 30.9 IN ADULT: ICD-10-CM

## 2024-02-06 RX ORDER — PHENTERMINE HYDROCHLORIDE 37.5 MG/1
37.5 CAPSULE ORAL EVERY MORNING
Qty: 30 CAPSULE | Refills: 0 | Status: SHIPPED | OUTPATIENT
Start: 2024-02-06 | End: 2024-03-07

## 2024-02-06 NOTE — TELEPHONE ENCOUNTER
From: Alvina Lieberman  To: Dr. Goldie Gimenez  Sent: 2/6/2024 9:38 AM EST  Subject: Phentermine    Please call in a refill to [facility] on [address]. Thank you!

## 2024-02-15 DIAGNOSIS — I10 ESSENTIAL HYPERTENSION: ICD-10-CM

## 2024-02-16 LAB
ALBUMIN SERPL-MCNC: 4.5 G/DL (ref 3.4–5)
ALBUMIN/GLOB SERPL: 1.5 {RATIO} (ref 1.1–2.2)
ALP SERPL-CCNC: 84 U/L (ref 40–129)
ALT SERPL-CCNC: 18 U/L (ref 10–40)
ANION GAP SERPL CALCULATED.3IONS-SCNC: 13 MMOL/L (ref 3–16)
AST SERPL-CCNC: 25 U/L (ref 15–37)
BILIRUB SERPL-MCNC: 0.4 MG/DL (ref 0–1)
BUN SERPL-MCNC: 13 MG/DL (ref 7–20)
CALCIUM SERPL-MCNC: 10.1 MG/DL (ref 8.3–10.6)
CHLORIDE SERPL-SCNC: 95 MMOL/L (ref 99–110)
CHOLEST SERPL-MCNC: 169 MG/DL (ref 0–199)
CO2 SERPL-SCNC: 27 MMOL/L (ref 21–32)
CREAT SERPL-MCNC: 1 MG/DL (ref 0.6–1.1)
GFR SERPLBLD CREATININE-BSD FMLA CKD-EPI: >60 ML/MIN/{1.73_M2}
GLUCOSE SERPL-MCNC: 96 MG/DL (ref 70–99)
HDLC SERPL-MCNC: 68 MG/DL (ref 40–60)
LDLC SERPL CALC-MCNC: 80 MG/DL
POTASSIUM SERPL-SCNC: 4.4 MMOL/L (ref 3.5–5.1)
PROT SERPL-MCNC: 7.5 G/DL (ref 6.4–8.2)
SODIUM SERPL-SCNC: 135 MMOL/L (ref 136–145)
TRIGL SERPL-MCNC: 103 MG/DL (ref 0–150)
VLDLC SERPL CALC-MCNC: 21 MG/DL

## 2024-02-19 ENCOUNTER — OFFICE VISIT (OUTPATIENT)
Dept: INTERNAL MEDICINE CLINIC | Age: 59
End: 2024-02-19
Payer: COMMERCIAL

## 2024-02-19 VITALS
HEIGHT: 67 IN | BODY MASS INDEX: 28.41 KG/M2 | WEIGHT: 181 LBS | DIASTOLIC BLOOD PRESSURE: 84 MMHG | SYSTOLIC BLOOD PRESSURE: 128 MMHG

## 2024-02-19 DIAGNOSIS — E66.09 CLASS 1 OBESITY DUE TO EXCESS CALORIES WITHOUT SERIOUS COMORBIDITY WITH BODY MASS INDEX (BMI) OF 30.0 TO 30.9 IN ADULT: ICD-10-CM

## 2024-02-19 PROCEDURE — 3074F SYST BP LT 130 MM HG: CPT | Performed by: INTERNAL MEDICINE

## 2024-02-19 PROCEDURE — 99213 OFFICE O/P EST LOW 20 MIN: CPT | Performed by: INTERNAL MEDICINE

## 2024-02-19 PROCEDURE — 3079F DIAST BP 80-89 MM HG: CPT | Performed by: INTERNAL MEDICINE

## 2024-02-19 RX ORDER — PHENTERMINE HYDROCHLORIDE 37.5 MG/1
37.5 CAPSULE ORAL EVERY MORNING
Qty: 30 CAPSULE | Refills: 0 | Status: SHIPPED | OUTPATIENT
Start: 2024-04-06 | End: 2024-05-06

## 2024-02-19 RX ORDER — PHENTERMINE HYDROCHLORIDE 37.5 MG/1
37.5 CAPSULE ORAL EVERY MORNING
Qty: 30 CAPSULE | Refills: 0 | Status: SHIPPED | OUTPATIENT
Start: 2024-03-07 | End: 2024-04-06

## 2024-02-19 ASSESSMENT — PATIENT HEALTH QUESTIONNAIRE - PHQ9
7. TROUBLE CONCENTRATING ON THINGS, SUCH AS READING THE NEWSPAPER OR WATCHING TELEVISION: 0
3. TROUBLE FALLING OR STAYING ASLEEP: 0
4. FEELING TIRED OR HAVING LITTLE ENERGY: 0
SUM OF ALL RESPONSES TO PHQ QUESTIONS 1-9: 0
5. POOR APPETITE OR OVEREATING: 0
6. FEELING BAD ABOUT YOURSELF - OR THAT YOU ARE A FAILURE OR HAVE LET YOURSELF OR YOUR FAMILY DOWN: 0
SUM OF ALL RESPONSES TO PHQ9 QUESTIONS 1 & 2: 0
2. FEELING DOWN, DEPRESSED OR HOPELESS: 0
SUM OF ALL RESPONSES TO PHQ QUESTIONS 1-9: 0
8. MOVING OR SPEAKING SO SLOWLY THAT OTHER PEOPLE COULD HAVE NOTICED. OR THE OPPOSITE, BEING SO FIGETY OR RESTLESS THAT YOU HAVE BEEN MOVING AROUND A LOT MORE THAN USUAL: 0
SUM OF ALL RESPONSES TO PHQ QUESTIONS 1-9: 0
10. IF YOU CHECKED OFF ANY PROBLEMS, HOW DIFFICULT HAVE THESE PROBLEMS MADE IT FOR YOU TO DO YOUR WORK, TAKE CARE OF THINGS AT HOME, OR GET ALONG WITH OTHER PEOPLE: 0
SUM OF ALL RESPONSES TO PHQ QUESTIONS 1-9: 0
1. LITTLE INTEREST OR PLEASURE IN DOING THINGS: 0
9. THOUGHTS THAT YOU WOULD BE BETTER OFF DEAD, OR OF HURTING YOURSELF: 0

## 2024-02-19 NOTE — PROGRESS NOTES
Alvina Lieberman (:  1965) is a 58 y.o. female,Established patient, here for evaluation of the following chief complaint(s):  Follow-up         ASSESSMENT/PLAN:  1. Class 1 obesity due to excess calories without serious comorbidity with body mass index (BMI) of 30.0 to 30.9 in adult  -She is doing very well with the current dose of phentermine.  She continues to lose weight steadily, closer to her goal.  Since she is continuing to steadily lose weight, can continue phentermine at current dose.  OARRS reviewed, no concerns  -     phentermine 37.5 MG capsule; Take 1 capsule by mouth every morning for 30 days. Max Daily Amount: 37.5 mg, Disp-30 capsule, R-0Normal  -     phentermine 37.5 MG capsule; Take 1 capsule by mouth every morning for 30 days. Max Daily Amount: 37.5 mg, Disp-30 capsule, R-0Normal      Return in about 2 months (around 2024) for weight management.         Subjective   SUBJECTIVE/OBJECTIVE:  HPI    She is doing well with the medication right now.  She has to manage her fluid intake and caffeine intake carefully when she takes the medication, but has been doing okay with it.  No palpitations, chest pressure.  She is adhering to diet and exercising regularly.  Goal weight is 165 pounds.    Review of Systems       Objective   Physical Exam  Vitals reviewed.   Constitutional:       General: She is not in acute distress.     Appearance: Normal appearance. She is well-developed.   HENT:      Head: Normocephalic and atraumatic.   Cardiovascular:      Rate and Rhythm: Normal rate and regular rhythm.      Heart sounds: Normal heart sounds.   Pulmonary:      Effort: Pulmonary effort is normal. No respiratory distress.      Breath sounds: Normal breath sounds.   Skin:     General: Skin is warm and dry.   Neurological:      Mental Status: She is alert.   Psychiatric:         Behavior: Behavior normal.         Thought Content: Thought content normal.         Judgment: Judgment normal.

## 2024-03-21 ENCOUNTER — HOSPITAL ENCOUNTER (OUTPATIENT)
Dept: MAMMOGRAPHY | Age: 59
Discharge: HOME | End: 2024-03-21
Payer: COMMERCIAL

## 2024-03-21 VITALS — BODY MASS INDEX: 26.68 KG/M2 | HEIGHT: 67 IN | WEIGHT: 170 LBS

## 2024-03-21 DIAGNOSIS — Z12.31 VISIT FOR SCREENING MAMMOGRAM: ICD-10-CM

## 2024-03-21 PROCEDURE — 77063 BREAST TOMOSYNTHESIS BI: CPT

## 2024-04-11 ENCOUNTER — PATIENT MESSAGE (OUTPATIENT)
Dept: INTERNAL MEDICINE CLINIC | Age: 59
End: 2024-04-11

## 2024-04-11 DIAGNOSIS — L21.8 OTHER SEBORRHEIC DERMATITIS: ICD-10-CM

## 2024-04-11 RX ORDER — KETOCONAZOLE 20 MG/ML
SHAMPOO TOPICAL
Qty: 120 ML | Refills: 2 | Status: SHIPPED | OUTPATIENT
Start: 2024-04-11

## 2024-04-11 NOTE — TELEPHONE ENCOUNTER
From: Alvina Lieberman  To: Dr. Goldie Gimenez  Sent: 4/11/2024 3:44 PM EDT  Subject: Scrip for Ketoconazole Shampoo?    Dr. Gimenez,  I have run out of an antifungal shampoo prescribed by Dr. Patricia Wolfe before she left [facility]. Would you be okay writing me an Rx for it? If not, I promise to find a dermatologist!!!    Ketoconazole Shampoo 2%    Thank you,  Alvina  
no

## 2024-04-22 ENCOUNTER — OFFICE VISIT (OUTPATIENT)
Dept: INTERNAL MEDICINE CLINIC | Age: 59
End: 2024-04-22
Payer: COMMERCIAL

## 2024-04-22 VITALS
HEIGHT: 67 IN | DIASTOLIC BLOOD PRESSURE: 68 MMHG | SYSTOLIC BLOOD PRESSURE: 124 MMHG | WEIGHT: 172 LBS | BODY MASS INDEX: 27 KG/M2

## 2024-04-22 DIAGNOSIS — E66.09 CLASS 1 OBESITY DUE TO EXCESS CALORIES WITHOUT SERIOUS COMORBIDITY WITH BODY MASS INDEX (BMI) OF 30.0 TO 30.9 IN ADULT: Primary | ICD-10-CM

## 2024-04-22 DIAGNOSIS — L21.8 OTHER SEBORRHEIC DERMATITIS: ICD-10-CM

## 2024-04-22 DIAGNOSIS — I10 ESSENTIAL HYPERTENSION: ICD-10-CM

## 2024-04-22 PROCEDURE — 3074F SYST BP LT 130 MM HG: CPT | Performed by: INTERNAL MEDICINE

## 2024-04-22 PROCEDURE — 3078F DIAST BP <80 MM HG: CPT | Performed by: INTERNAL MEDICINE

## 2024-04-22 PROCEDURE — 99214 OFFICE O/P EST MOD 30 MIN: CPT | Performed by: INTERNAL MEDICINE

## 2024-04-22 RX ORDER — KETOCONAZOLE 20 MG/ML
SHAMPOO TOPICAL
Qty: 360 ML | Refills: 2 | Status: SHIPPED | OUTPATIENT
Start: 2024-04-22

## 2024-04-22 RX ORDER — PHENTERMINE HYDROCHLORIDE 37.5 MG/1
37.5 CAPSULE ORAL EVERY MORNING
Qty: 30 CAPSULE | Refills: 0 | Status: SHIPPED | OUTPATIENT
Start: 2024-05-22 | End: 2024-06-21

## 2024-04-22 SDOH — ECONOMIC STABILITY: FOOD INSECURITY: WITHIN THE PAST 12 MONTHS, YOU WORRIED THAT YOUR FOOD WOULD RUN OUT BEFORE YOU GOT MONEY TO BUY MORE.: NEVER TRUE

## 2024-04-22 SDOH — ECONOMIC STABILITY: INCOME INSECURITY: HOW HARD IS IT FOR YOU TO PAY FOR THE VERY BASICS LIKE FOOD, HOUSING, MEDICAL CARE, AND HEATING?: NOT HARD AT ALL

## 2024-04-22 SDOH — ECONOMIC STABILITY: FOOD INSECURITY: WITHIN THE PAST 12 MONTHS, THE FOOD YOU BOUGHT JUST DIDN'T LAST AND YOU DIDN'T HAVE MONEY TO GET MORE.: NEVER TRUE

## 2024-04-22 NOTE — PROGRESS NOTES
Alvina Lieberman (:  1965) is a 58 y.o. female,Established patient, here for evaluation of the following chief complaint(s):  Weight Management      Assessment & Plan   1. Class 1 obesity due to excess calories without serious comorbidity with body mass index (BMI) of 30.0 to 30.9 in adult   -She is doing very well, close to goal weight of 165 pounds.  Continue phentermine 37.5 mg daily for the next 2 months, we will reassess again in . Essential hypertension   -Well-controlled, continue olmesartan 40 mg daily  3. Other seborrheic dermatitis  -     ketoconazole (NIZORAL) 2 % shampoo; Use to wash the scalp 3 times weekly.  Leave on the scalp for 5 minutes prior to rinsing., Disp-360 mL, R-2, Normal      Return in about 2 months (around 2024) for weight check.       Subjective   HPI    She is continue to work hard on weight loss.  She is happy with the progress she has been able to make.  She is doing well with the medication, manages hydration, not having significant side effects.  She has lost another 4 pounds over the last 2 months.    Blood pressure has been looking good with the current medication.  No significant side effects.    Seborrheic dermatitis has improved with the ketoconazole shampoo, though would be helpful to have a larger quantity so she does not run out as quickly.    Review of Systems       Objective   Physical Exam  Vitals reviewed.   Constitutional:       General: She is not in acute distress.     Appearance: Normal appearance. She is well-developed.   HENT:      Head: Normocephalic and atraumatic.   Cardiovascular:      Rate and Rhythm: Normal rate and regular rhythm.      Heart sounds: Normal heart sounds.   Pulmonary:      Effort: Pulmonary effort is normal. No respiratory distress.      Breath sounds: Normal breath sounds.   Skin:     General: Skin is warm and dry.   Neurological:      Mental Status: She is alert.   Psychiatric:         Behavior: Behavior normal.

## 2024-05-07 RX ORDER — OLMESARTAN MEDOXOMIL 40 MG/1
40 TABLET ORAL DAILY
Qty: 90 TABLET | Refills: 1 | Status: SHIPPED | OUTPATIENT
Start: 2024-05-07

## 2024-06-24 ENCOUNTER — OFFICE VISIT (OUTPATIENT)
Dept: INTERNAL MEDICINE CLINIC | Age: 59
End: 2024-06-24
Payer: COMMERCIAL

## 2024-06-24 VITALS
WEIGHT: 166 LBS | DIASTOLIC BLOOD PRESSURE: 66 MMHG | SYSTOLIC BLOOD PRESSURE: 124 MMHG | BODY MASS INDEX: 26.06 KG/M2 | HEIGHT: 67 IN

## 2024-06-24 DIAGNOSIS — Z23 NEED FOR TDAP VACCINATION: Primary | ICD-10-CM

## 2024-06-24 DIAGNOSIS — E66.09 CLASS 1 OBESITY DUE TO EXCESS CALORIES WITHOUT SERIOUS COMORBIDITY WITH BODY MASS INDEX (BMI) OF 30.0 TO 30.9 IN ADULT: ICD-10-CM

## 2024-06-24 DIAGNOSIS — Z00.00 WELL ADULT EXAM: ICD-10-CM

## 2024-06-24 DIAGNOSIS — I10 ESSENTIAL HYPERTENSION: ICD-10-CM

## 2024-06-24 PROCEDURE — 90471 IMMUNIZATION ADMIN: CPT | Performed by: INTERNAL MEDICINE

## 2024-06-24 PROCEDURE — 90715 TDAP VACCINE 7 YRS/> IM: CPT | Performed by: INTERNAL MEDICINE

## 2024-06-24 PROCEDURE — 3078F DIAST BP <80 MM HG: CPT | Performed by: INTERNAL MEDICINE

## 2024-06-24 PROCEDURE — 99396 PREV VISIT EST AGE 40-64: CPT | Performed by: INTERNAL MEDICINE

## 2024-06-24 PROCEDURE — 3074F SYST BP LT 130 MM HG: CPT | Performed by: INTERNAL MEDICINE

## 2024-06-24 RX ORDER — PHENTERMINE HYDROCHLORIDE 37.5 MG/1
37.5 CAPSULE ORAL EVERY MORNING
Qty: 30 CAPSULE | Refills: 0 | Status: SHIPPED | OUTPATIENT
Start: 2024-06-24 | End: 2024-07-24

## 2024-06-24 NOTE — PROGRESS NOTES
Zoster Recombinant (Shingrix) 06/03/2018, 10/18/2018       Health Maintenance   Topic Date Due    Hepatitis B vaccine (1 of 3 - 3-dose series) Never done    Cervical cancer screen  06/11/2023    Depression Screen  02/19/2025    Breast cancer screen  03/21/2025    Colorectal Cancer Screen  09/22/2028    Lipids  02/15/2029    DTaP/Tdap/Td vaccine (2 - Td or Tdap) 06/24/2034    Flu vaccine  Completed    Shingles vaccine  Completed    COVID-19 Vaccine  Completed    Hepatitis C screen  Completed    HIV screen  Completed    Hepatitis A vaccine  Aged Out    Hib vaccine  Aged Out    Polio vaccine  Aged Out    Meningococcal (ACWY) vaccine  Aged Out    Pneumococcal 0-64 years Vaccine  Aged Out    Depression Monitoring  Discontinued    Diabetes screen  Discontinued       ASSESSMENT/PLAN:  1. Well adult exam  - Discussed age appropriate preventive care including healthy diet, daily exercise, immunizations and age & gender guided screening tests.     2. Essential hypertension  - stable, continue olmesartan 40mg daily    3. Class 1 obesity due to excess calories without serious comorbidity with body mass index (BMI) of 30.0 to 30.9 in adult  - very close to goal. Will continue for 1 more month, at that point will discontinue even if weight is stable, she has still lost nearly 40 lbs. OARRS reviewed, no concerns.  - phentermine 37.5 MG capsule; Take 1 capsule by mouth every morning for 30 days. Max Daily Amount: 37.5 mg  Dispense: 30 capsule; Refill: 0    4. Need for Tdap vaccination  - Tdap, BOOSTRIX, (age 10 yrs+), IM      No follow-ups on file.

## 2024-06-25 ASSESSMENT — ENCOUNTER SYMPTOMS
DIARRHEA: 0
CONSTIPATION: 0
SINUS PAIN: 0
COUGH: 0
SHORTNESS OF BREATH: 0

## 2024-09-27 ENCOUNTER — OFFICE VISIT (OUTPATIENT)
Dept: INTERNAL MEDICINE CLINIC | Age: 59
End: 2024-09-27

## 2024-09-27 VITALS
DIASTOLIC BLOOD PRESSURE: 92 MMHG | WEIGHT: 169 LBS | OXYGEN SATURATION: 99 % | BODY MASS INDEX: 26.53 KG/M2 | SYSTOLIC BLOOD PRESSURE: 142 MMHG | HEIGHT: 67 IN | HEART RATE: 73 BPM

## 2024-09-27 DIAGNOSIS — Z00.00 MEDICARE ANNUAL WELLNESS VISIT, SUBSEQUENT: ICD-10-CM

## 2024-09-27 DIAGNOSIS — Z12.4 PAP SMEAR FOR CERVICAL CANCER SCREENING: ICD-10-CM

## 2024-09-27 DIAGNOSIS — E66.09 CLASS 1 OBESITY DUE TO EXCESS CALORIES WITHOUT SERIOUS COMORBIDITY WITH BODY MASS INDEX (BMI) OF 30.0 TO 30.9 IN ADULT: ICD-10-CM

## 2024-09-27 DIAGNOSIS — I10 ESSENTIAL HYPERTENSION: Primary | ICD-10-CM

## 2024-09-27 DIAGNOSIS — L82.1 SEBORRHEIC KERATOSIS: ICD-10-CM
